# Patient Record
Sex: MALE | Race: WHITE | NOT HISPANIC OR LATINO | Employment: STUDENT | URBAN - METROPOLITAN AREA
[De-identification: names, ages, dates, MRNs, and addresses within clinical notes are randomized per-mention and may not be internally consistent; named-entity substitution may affect disease eponyms.]

---

## 2017-09-30 ENCOUNTER — HOSPITAL ENCOUNTER (INPATIENT)
Facility: HOSPITAL | Age: 22
LOS: 6 days | Discharge: HOME | DRG: 340 | End: 2017-10-06
Attending: EMERGENCY MEDICINE | Admitting: SURGERY
Payer: COMMERCIAL

## 2017-09-30 DIAGNOSIS — K35.32 PERFORATED APPENDICITIS: ICD-10-CM

## 2017-09-30 DIAGNOSIS — R00.0 TACHYCARDIA WITH HEART RATE 100-120 BEATS PER MINUTE: ICD-10-CM

## 2017-09-30 LAB
ALBUMIN SERPL BCP-MCNC: 4 G/DL
ALP SERPL-CCNC: 59 U/L
ALT SERPL W/O P-5'-P-CCNC: 16 U/L
ANION GAP SERPL CALC-SCNC: 13 MMOL/L
AST SERPL-CCNC: 16 U/L
BACTERIA #/AREA URNS AUTO: NORMAL /HPF
BASOPHILS # BLD AUTO: 0.01 K/UL
BASOPHILS NFR BLD: 0.1 %
BILIRUB SERPL-MCNC: 1.6 MG/DL
BILIRUB UR QL STRIP: NEGATIVE
BUN SERPL-MCNC: 16 MG/DL
CALCIUM SERPL-MCNC: 10.3 MG/DL
CHLORIDE SERPL-SCNC: 99 MMOL/L
CLARITY UR REFRACT.AUTO: CLEAR
CO2 SERPL-SCNC: 22 MMOL/L
COLOR UR AUTO: YELLOW
CREAT SERPL-MCNC: 1.2 MG/DL
DIFFERENTIAL METHOD: ABNORMAL
EOSINOPHIL # BLD AUTO: 0 K/UL
EOSINOPHIL NFR BLD: 0 %
ERYTHROCYTE [DISTWIDTH] IN BLOOD BY AUTOMATED COUNT: 13.4 %
EST. GFR  (AFRICAN AMERICAN): >60 ML/MIN/1.73 M^2
EST. GFR  (NON AFRICAN AMERICAN): >60 ML/MIN/1.73 M^2
GLUCOSE SERPL-MCNC: 132 MG/DL
GLUCOSE UR QL STRIP: NEGATIVE
HCT VFR BLD AUTO: 43.7 %
HGB BLD-MCNC: 15.5 G/DL
HGB UR QL STRIP: ABNORMAL
HYALINE CASTS UR QL AUTO: 0 /LPF
KETONES UR QL STRIP: NEGATIVE
LACTATE SERPL-SCNC: 1.9 MMOL/L
LEUKOCYTE ESTERASE UR QL STRIP: NEGATIVE
LIPASE SERPL-CCNC: 4 U/L
LYMPHOCYTES # BLD AUTO: 0.5 K/UL
LYMPHOCYTES NFR BLD: 3.7 %
MCH RBC QN AUTO: 29.3 PG
MCHC RBC AUTO-ENTMCNC: 35.5 G/DL
MCV RBC AUTO: 83 FL
MICROSCOPIC COMMENT: NORMAL
MONOCYTES # BLD AUTO: 0.5 K/UL
MONOCYTES NFR BLD: 3.6 %
NEUTROPHILS # BLD AUTO: 13.5 K/UL
NEUTROPHILS NFR BLD: 92.3 %
NITRITE UR QL STRIP: NEGATIVE
PH UR STRIP: 6 [PH] (ref 5–8)
PLATELET # BLD AUTO: 188 K/UL
PMV BLD AUTO: 10.3 FL
POCT GLUCOSE: 125 MG/DL (ref 70–110)
POTASSIUM SERPL-SCNC: 3.8 MMOL/L
PROT SERPL-MCNC: 8.4 G/DL
PROT UR QL STRIP: ABNORMAL
RBC # BLD AUTO: 5.29 M/UL
RBC #/AREA URNS AUTO: 4 /HPF (ref 0–4)
SODIUM SERPL-SCNC: 134 MMOL/L
SP GR UR STRIP: >=1.03 (ref 1–1.03)
URN SPEC COLLECT METH UR: ABNORMAL
UROBILINOGEN UR STRIP-ACNC: NEGATIVE EU/DL
WBC # BLD AUTO: 14.65 K/UL
WBC #/AREA URNS AUTO: 1 /HPF (ref 0–5)

## 2017-09-30 PROCEDURE — 11000001 HC ACUTE MED/SURG PRIVATE ROOM

## 2017-09-30 PROCEDURE — 63600175 PHARM REV CODE 636 W HCPCS: Performed by: EMERGENCY MEDICINE

## 2017-09-30 PROCEDURE — 96375 TX/PRO/DX INJ NEW DRUG ADDON: CPT

## 2017-09-30 PROCEDURE — 96365 THER/PROPH/DIAG IV INF INIT: CPT

## 2017-09-30 PROCEDURE — 96361 HYDRATE IV INFUSION ADD-ON: CPT

## 2017-09-30 PROCEDURE — S0030 INJECTION, METRONIDAZOLE: HCPCS | Performed by: EMERGENCY MEDICINE

## 2017-09-30 PROCEDURE — 96367 TX/PROPH/DG ADDL SEQ IV INF: CPT

## 2017-09-30 PROCEDURE — 25500020 PHARM REV CODE 255: Performed by: EMERGENCY MEDICINE

## 2017-09-30 PROCEDURE — 94761 N-INVAS EAR/PLS OXIMETRY MLT: CPT

## 2017-09-30 PROCEDURE — 96366 THER/PROPH/DIAG IV INF ADDON: CPT

## 2017-09-30 PROCEDURE — 82962 GLUCOSE BLOOD TEST: CPT

## 2017-09-30 PROCEDURE — 80053 COMPREHEN METABOLIC PANEL: CPT

## 2017-09-30 PROCEDURE — 99285 EMERGENCY DEPT VISIT HI MDM: CPT | Mod: 25

## 2017-09-30 PROCEDURE — 93005 ELECTROCARDIOGRAM TRACING: CPT

## 2017-09-30 PROCEDURE — 83690 ASSAY OF LIPASE: CPT

## 2017-09-30 PROCEDURE — 83605 ASSAY OF LACTIC ACID: CPT

## 2017-09-30 PROCEDURE — 85025 COMPLETE CBC W/AUTO DIFF WBC: CPT

## 2017-09-30 PROCEDURE — 99285 EMERGENCY DEPT VISIT HI MDM: CPT | Mod: ,,, | Performed by: EMERGENCY MEDICINE

## 2017-09-30 PROCEDURE — 81001 URINALYSIS AUTO W/SCOPE: CPT

## 2017-09-30 PROCEDURE — 63600175 PHARM REV CODE 636 W HCPCS: Performed by: STUDENT IN AN ORGANIZED HEALTH CARE EDUCATION/TRAINING PROGRAM

## 2017-09-30 PROCEDURE — 25000003 PHARM REV CODE 250: Performed by: STUDENT IN AN ORGANIZED HEALTH CARE EDUCATION/TRAINING PROGRAM

## 2017-09-30 PROCEDURE — 93010 ELECTROCARDIOGRAM REPORT: CPT | Mod: ,,, | Performed by: INTERNAL MEDICINE

## 2017-09-30 PROCEDURE — 25000003 PHARM REV CODE 250: Performed by: SURGERY

## 2017-09-30 PROCEDURE — 25000003 PHARM REV CODE 250: Performed by: EMERGENCY MEDICINE

## 2017-09-30 PROCEDURE — 96376 TX/PRO/DX INJ SAME DRUG ADON: CPT

## 2017-09-30 PROCEDURE — 87040 BLOOD CULTURE FOR BACTERIA: CPT

## 2017-09-30 RX ORDER — DIPHENHYDRAMINE HYDROCHLORIDE 50 MG/ML
12.5 INJECTION INTRAMUSCULAR; INTRAVENOUS EVERY 6 HOURS PRN
Status: DISCONTINUED | OUTPATIENT
Start: 2017-09-30 | End: 2017-10-06

## 2017-09-30 RX ORDER — HYDROMORPHONE HYDROCHLORIDE 1 MG/ML
0.5 INJECTION, SOLUTION INTRAMUSCULAR; INTRAVENOUS; SUBCUTANEOUS
Status: COMPLETED | OUTPATIENT
Start: 2017-09-30 | End: 2017-09-30

## 2017-09-30 RX ORDER — HYDROMORPHONE HYDROCHLORIDE 1 MG/ML
0.5 INJECTION, SOLUTION INTRAMUSCULAR; INTRAVENOUS; SUBCUTANEOUS EVERY 4 HOURS PRN
Status: DISCONTINUED | OUTPATIENT
Start: 2017-09-30 | End: 2017-10-03

## 2017-09-30 RX ORDER — ZOLPIDEM TARTRATE 5 MG/1
5 TABLET ORAL NIGHTLY PRN
Status: DISCONTINUED | OUTPATIENT
Start: 2017-09-30 | End: 2017-10-06

## 2017-09-30 RX ORDER — KETOROLAC TROMETHAMINE 15 MG/ML
15 INJECTION, SOLUTION INTRAMUSCULAR; INTRAVENOUS ONCE
Status: DISCONTINUED | OUTPATIENT
Start: 2017-09-30 | End: 2017-10-03

## 2017-09-30 RX ORDER — FAMOTIDINE 10 MG/ML
20 INJECTION INTRAVENOUS 2 TIMES DAILY
Status: DISCONTINUED | OUTPATIENT
Start: 2017-09-30 | End: 2017-10-06 | Stop reason: HOSPADM

## 2017-09-30 RX ORDER — ACETAMINOPHEN 325 MG/1
650 TABLET ORAL ONCE
Status: COMPLETED | OUTPATIENT
Start: 2017-09-30 | End: 2017-09-30

## 2017-09-30 RX ORDER — METRONIDAZOLE 500 MG/100ML
500 INJECTION, SOLUTION INTRAVENOUS
Status: DISCONTINUED | OUTPATIENT
Start: 2017-09-30 | End: 2017-10-05

## 2017-09-30 RX ORDER — IPRATROPIUM BROMIDE AND ALBUTEROL SULFATE 2.5; .5 MG/3ML; MG/3ML
3 SOLUTION RESPIRATORY (INHALATION) EVERY 6 HOURS PRN
Status: DISCONTINUED | OUTPATIENT
Start: 2017-09-30 | End: 2017-10-06 | Stop reason: HOSPADM

## 2017-09-30 RX ORDER — OXYCODONE AND ACETAMINOPHEN 10; 325 MG/1; MG/1
1 TABLET ORAL EVERY 4 HOURS PRN
Status: DISCONTINUED | OUTPATIENT
Start: 2017-09-30 | End: 2017-10-06

## 2017-09-30 RX ORDER — ENOXAPARIN SODIUM 100 MG/ML
40 INJECTION SUBCUTANEOUS
Status: DISCONTINUED | OUTPATIENT
Start: 2017-10-01 | End: 2017-10-06

## 2017-09-30 RX ORDER — ONDANSETRON 2 MG/ML
4 INJECTION INTRAMUSCULAR; INTRAVENOUS
Status: COMPLETED | OUTPATIENT
Start: 2017-09-30 | End: 2017-09-30

## 2017-09-30 RX ORDER — CIPROFLOXACIN 2 MG/ML
400 INJECTION, SOLUTION INTRAVENOUS
Status: COMPLETED | OUTPATIENT
Start: 2017-09-30 | End: 2017-09-30

## 2017-09-30 RX ORDER — OXYCODONE AND ACETAMINOPHEN 5; 325 MG/1; MG/1
1 TABLET ORAL EVERY 4 HOURS PRN
Status: DISCONTINUED | OUTPATIENT
Start: 2017-09-30 | End: 2017-10-06

## 2017-09-30 RX ORDER — SODIUM CHLORIDE, SODIUM LACTATE, POTASSIUM CHLORIDE, CALCIUM CHLORIDE 600; 310; 30; 20 MG/100ML; MG/100ML; MG/100ML; MG/100ML
INJECTION, SOLUTION INTRAVENOUS CONTINUOUS
Status: DISCONTINUED | OUTPATIENT
Start: 2017-09-30 | End: 2017-10-05

## 2017-09-30 RX ORDER — METRONIDAZOLE 500 MG/100ML
500 INJECTION, SOLUTION INTRAVENOUS
Status: COMPLETED | OUTPATIENT
Start: 2017-09-30 | End: 2017-09-30

## 2017-09-30 RX ORDER — ACETAMINOPHEN 325 MG/1
650 TABLET ORAL EVERY 4 HOURS PRN
Status: DISCONTINUED | OUTPATIENT
Start: 2017-09-30 | End: 2017-10-06 | Stop reason: HOSPADM

## 2017-09-30 RX ORDER — ONDANSETRON 2 MG/ML
8 INJECTION INTRAMUSCULAR; INTRAVENOUS EVERY 8 HOURS PRN
Status: DISCONTINUED | OUTPATIENT
Start: 2017-09-30 | End: 2017-10-06 | Stop reason: HOSPADM

## 2017-09-30 RX ORDER — CIPROFLOXACIN 2 MG/ML
400 INJECTION, SOLUTION INTRAVENOUS
Status: DISCONTINUED | OUTPATIENT
Start: 2017-09-30 | End: 2017-10-05

## 2017-09-30 RX ADMIN — HYDROMORPHONE HYDROCHLORIDE 0.5 MG: 1 INJECTION, SOLUTION INTRAMUSCULAR; INTRAVENOUS; SUBCUTANEOUS at 04:09

## 2017-09-30 RX ADMIN — METRONIDAZOLE 500 MG: 500 INJECTION, SOLUTION INTRAVENOUS at 07:09

## 2017-09-30 RX ADMIN — ACETAMINOPHEN 650 MG: 325 TABLET ORAL at 04:09

## 2017-09-30 RX ADMIN — ONDANSETRON 4 MG: 2 INJECTION INTRAMUSCULAR; INTRAVENOUS at 04:09

## 2017-09-30 RX ADMIN — HYDROMORPHONE HYDROCHLORIDE 0.5 MG: 1 INJECTION, SOLUTION INTRAMUSCULAR; INTRAVENOUS; SUBCUTANEOUS at 08:09

## 2017-09-30 RX ADMIN — CIPROFLOXACIN 400 MG: 2 INJECTION, SOLUTION INTRAVENOUS at 07:09

## 2017-09-30 RX ADMIN — SODIUM CHLORIDE 1000 ML: 0.9 INJECTION, SOLUTION INTRAVENOUS at 03:09

## 2017-09-30 RX ADMIN — IOHEXOL 75 ML: 350 INJECTION, SOLUTION INTRAVENOUS at 05:09

## 2017-09-30 RX ADMIN — SODIUM CHLORIDE 1000 ML: 0.9 INJECTION, SOLUTION INTRAVENOUS at 06:09

## 2017-09-30 RX ADMIN — SODIUM CHLORIDE, SODIUM LACTATE, POTASSIUM CHLORIDE, AND CALCIUM CHLORIDE: 600; 310; 30; 20 INJECTION, SOLUTION INTRAVENOUS at 08:09

## 2017-09-30 NOTE — ED NOTES
LOC: The patient is awake, alert, and oriented to place, time, situation. Affect is appropriate.  Speech is appropriate and clear.     APPEARANCE: Patient resting on stretcher; appears uncomfortable but in no acute distress.  Patient is clean and well groomed.    SKIN: The skin is warm and dry; color consistent with ethnicity.  Patient has normal skin turgor and moist mucus membranes.  Skin intact; no breakdown or bruising noted.     MUSCULOSKELETAL: Patient moving upper and lower extremities without difficulty.  Complains of generalized weakness.     RESPIRATORY: Airway is open and patent. Respirations spontaneous, even, easy, and non-labored.  Patient has a normal effort and rate.  No accessory muscle use noted. Denies cough.     CARDIAC:  Normal rhythm and rate noted.  No peripheral edema noted. No complaints of chest pain.      ABDOMEN: Soft but tender to palpation to the lower abdomen.  No distention noted.     NEUROLOGIC: Eyes open spontaneously.  Behavior appropriate to situation.  Follows commands; facial expression symmetrical.  Purposeful motor response noted; normal sensation in all extremities.  Pt complains of dizziness and lightheadedness.

## 2017-09-30 NOTE — ED TRIAGE NOTES
Pt states he started to experience constipation and gas pains on Thursday morning, took laxatives and started to experience severe abdominal pain with multiple episodes of diarrhea, nausea and vomiting on Friday.  Pt also complains of feeling dizzy and lightheaded.  Pt states symptoms are worse with eating.

## 2017-09-30 NOTE — ED PROVIDER NOTES
Encounter Date: 9/30/2017       History     Chief Complaint   Patient presents with    Abdominal Pain     and diarrhea X 3 days; fevers off and on     20 yo man with no significant PMHx who presents to the ED for worsening abdominal pain.  Patient reports having constipation and gas pains that started on Thursday for which he took laxatives.  Prior to Thursday, his last BM was Wednesday.  On Friday, he then developed profuse diarrhea (watery and green) on Friday.  He reports having ~10 episodes of diarrhea associated with nausea and vomiting 4x.  Patient developed severe intermittent, throbbing lower abdominal pain yesterday that has progressively worsened and seems to hurt worse with any movement or eating.  Also has associated lightheadedness and sweats. Denies any urinary complaints, shoulder pain, back pain, SOB, CP.           Review of patient's allergies indicates:  No Known Allergies  History reviewed. No pertinent past medical history.  Past Surgical History:   Procedure Laterality Date    MOUTH SURGERY       History reviewed. No pertinent family history.  Social History   Substance Use Topics    Smoking status: Never Smoker    Smokeless tobacco: Never Used    Alcohol use 6.0 oz/week     10 Cans of beer per week     Review of Systems   Constitutional: Positive for appetite change, diaphoresis and fever.   HENT: Negative for sore throat and trouble swallowing.    Eyes: Negative for visual disturbance.   Respiratory: Negative for chest tightness and shortness of breath.    Cardiovascular: Negative for chest pain and leg swelling.   Gastrointestinal: Positive for abdominal pain (lower), diarrhea, nausea and vomiting. Negative for abdominal distention, blood in stool and constipation.   Genitourinary: Negative for difficulty urinating and dysuria.   Musculoskeletal: Negative for back pain.        No shoulder pain   Skin: Negative for color change.   Neurological: Positive for dizziness and light-headedness.        Physical Exam     Initial Vitals [09/30/17 1512]   BP Pulse Resp Temp SpO2   (!) 105/58 (!) 130 18 98.7 °F (37.1 °C) 97 %      MAP       73.67         Physical Exam    Constitutional: He appears well-developed and well-nourished.   Ill appearing. Fever of 101     HENT:   Head: Normocephalic and atraumatic.   Dry mucus membranes   Eyes: Conjunctivae and EOM are normal. Pupils are equal, round, and reactive to light.   Cardiovascular: Regular rhythm, normal heart sounds and intact distal pulses.   tachycardic   Pulmonary/Chest: Breath sounds normal. No respiratory distress.   Abdominal: Soft. Bowel sounds are normal. He exhibits no distension. There is tenderness (lower abdomen L>R). There is guarding. There is no rebound.   Worsening abdominal pain with rocking hips.     Musculoskeletal: Normal range of motion. He exhibits no edema.   Neurological: He is alert and oriented to person, place, and time.   Skin: Skin is warm and dry.   Psychiatric: He has a normal mood and affect.         ED Course   Procedures  Labs Reviewed   CBC W/ AUTO DIFFERENTIAL - Abnormal; Notable for the following:        Result Value    WBC 14.65 (*)     Gran # 13.5 (*)     Lymph # 0.5 (*)     Gran% 92.3 (*)     Lymph% 3.7 (*)     Mono% 3.6 (*)     All other components within normal limits   COMPREHENSIVE METABOLIC PANEL - Abnormal; Notable for the following:     Sodium 134 (*)     CO2 22 (*)     Glucose 132 (*)     Total Bilirubin 1.6 (*)     All other components within normal limits   URINALYSIS, REFLEX TO URINE CULTURE - Abnormal; Notable for the following:     Specific Gravity, UA >=1.030 (*)     Protein, UA 1+ (*)     Occult Blood UA 1+ (*)     All other components within normal limits    Narrative:     Preferred Collection Type->Urine, Clean Catch   POCT GLUCOSE - Abnormal; Notable for the following:     POCT Glucose 125 (*)     All other components within normal limits   LIPASE   LACTIC ACID, PLASMA   URINALYSIS MICROSCOPIC     Narrative:     Preferred Collection Type->Urine, Clean Catch     EKG Readings: (Independently Interpreted)   Rhythm: Sinus Tachycardia.       X-Rays:   Independently Interpreted Readings:   Abdomen: CAT scan of abdomen and pelvis with IV contrast showed acute appendicitis also with an apparent contained perforation     Medical Decision Making:   Initial Assessment:   Patient evaluated because of abdominal discomfort this been going on for several days which has become acute this is associated with peritoneal signs  Differential Diagnosis:   Appendicitis colitis  Clinical Tests:   Lab Tests: Ordered and Reviewed  Radiological Study: Ordered and Reviewed  Medical Tests: Ordered and Reviewed  ED Management:  Will do labs hydrate the patient control the pain control the nausea do abdomen and pelvis CT with IV contrast       APC / Resident Notes:   21M with no PMHx is here for 2 days of diarrhea and severe lower abdominal pain associated with nausea, vomiting, and fever.     Ddx includes but not limited to appendicitis, gastroenteritis, peritonitis, colitis, cholecystitis, hernia, diverticulitis, cystitis.  Patient is hypotensive, tachycardic, and febrile at 101.1 with peritoneal signs on exam.  Will order basic labs, UA, lactate, blood cultures, and obtain CT abdomen.  Will give 2L bolus, tylenol, zofran, and dilaudid. Vital signs have improved after fluids and pain is better controlled. CBC shows leukocytosis with WBC 14.7. CT abdomen shows acute appendicitis with contained perforation. Patient given one dose of Cipro and Flagyl in the ED. General surgery consulted and will admit patient for management of acute appendicitis.          Attending Attestation:             Attending ED Notes:   Patient presenting with abdominal discomfort for several days which has continued to worsen now presenting with peritoneal signs and very concern for acute abdomen patient was evaluated with blood work blood cultures patient was  hydrated patient was given pain medication and medication for nausea patient had a CT of the abdomen and pelvis with IV contrast which showed an acute appendicitis with a contained perforation patient was evaluated by general surgery and the patient was admitted for surgery to the general surgery service patient was in serious condition          ED Course      Clinical Impression:   Diagnoses of Tachycardia with heart rate 100-120 beats per minute and Perforated appendicitis were pertinent to this visit.    Disposition:   Disposition: Admitted  Condition: Serious                        Nick Mtz MD  10/01/17 0722

## 2017-10-01 LAB
ALBUMIN SERPL BCP-MCNC: 3 G/DL
ALP SERPL-CCNC: 55 U/L
ALT SERPL W/O P-5'-P-CCNC: 11 U/L
ANION GAP SERPL CALC-SCNC: 10 MMOL/L
AST SERPL-CCNC: 11 U/L
BASOPHILS # BLD AUTO: 0 K/UL
BASOPHILS NFR BLD: 0 %
BILIRUB SERPL-MCNC: 0.9 MG/DL
BUN SERPL-MCNC: 14 MG/DL
CALCIUM SERPL-MCNC: 9.5 MG/DL
CHLORIDE SERPL-SCNC: 102 MMOL/L
CO2 SERPL-SCNC: 22 MMOL/L
CREAT SERPL-MCNC: 0.9 MG/DL
DIFFERENTIAL METHOD: ABNORMAL
EOSINOPHIL # BLD AUTO: 0 K/UL
EOSINOPHIL NFR BLD: 0 %
ERYTHROCYTE [DISTWIDTH] IN BLOOD BY AUTOMATED COUNT: 13.9 %
EST. GFR  (AFRICAN AMERICAN): >60 ML/MIN/1.73 M^2
EST. GFR  (NON AFRICAN AMERICAN): >60 ML/MIN/1.73 M^2
GLUCOSE SERPL-MCNC: 117 MG/DL
HCT VFR BLD AUTO: 36.6 %
HGB BLD-MCNC: 12.4 G/DL
LYMPHOCYTES # BLD AUTO: 0.4 K/UL
LYMPHOCYTES NFR BLD: 3.1 %
MCH RBC QN AUTO: 28.4 PG
MCHC RBC AUTO-ENTMCNC: 33.9 G/DL
MCV RBC AUTO: 84 FL
MONOCYTES # BLD AUTO: 0.8 K/UL
MONOCYTES NFR BLD: 6.4 %
NEUTROPHILS # BLD AUTO: 11.4 K/UL
NEUTROPHILS NFR BLD: 90.3 %
PLATELET # BLD AUTO: 154 K/UL
PMV BLD AUTO: 10.6 FL
POTASSIUM SERPL-SCNC: 3.5 MMOL/L
PROT SERPL-MCNC: 6.5 G/DL
RBC # BLD AUTO: 4.36 M/UL
SODIUM SERPL-SCNC: 134 MMOL/L
WBC # BLD AUTO: 12.57 K/UL

## 2017-10-01 PROCEDURE — 11000001 HC ACUTE MED/SURG PRIVATE ROOM

## 2017-10-01 PROCEDURE — 63600175 PHARM REV CODE 636 W HCPCS: Performed by: STUDENT IN AN ORGANIZED HEALTH CARE EDUCATION/TRAINING PROGRAM

## 2017-10-01 PROCEDURE — 63600175 PHARM REV CODE 636 W HCPCS: Performed by: SURGERY

## 2017-10-01 PROCEDURE — 80053 COMPREHEN METABOLIC PANEL: CPT

## 2017-10-01 PROCEDURE — S0030 INJECTION, METRONIDAZOLE: HCPCS | Performed by: SURGERY

## 2017-10-01 PROCEDURE — 99223 1ST HOSP IP/OBS HIGH 75: CPT | Mod: ,,, | Performed by: SURGERY

## 2017-10-01 PROCEDURE — 25000003 PHARM REV CODE 250: Performed by: SURGERY

## 2017-10-01 PROCEDURE — S0028 INJECTION, FAMOTIDINE, 20 MG: HCPCS | Performed by: SURGERY

## 2017-10-01 PROCEDURE — 36415 COLL VENOUS BLD VENIPUNCTURE: CPT

## 2017-10-01 PROCEDURE — 85025 COMPLETE CBC W/AUTO DIFF WBC: CPT

## 2017-10-01 RX ADMIN — CIPROFLOXACIN 400 MG: 2 INJECTION, SOLUTION INTRAVENOUS at 08:10

## 2017-10-01 RX ADMIN — OXYCODONE HYDROCHLORIDE AND ACETAMINOPHEN 1 TABLET: 10; 325 TABLET ORAL at 12:10

## 2017-10-01 RX ADMIN — METRONIDAZOLE 500 MG: 500 INJECTION, SOLUTION INTRAVENOUS at 10:10

## 2017-10-01 RX ADMIN — ONDANSETRON 8 MG: 2 INJECTION INTRAMUSCULAR; INTRAVENOUS at 11:10

## 2017-10-01 RX ADMIN — FAMOTIDINE 20 MG: 10 INJECTION, SOLUTION INTRAVENOUS at 08:10

## 2017-10-01 RX ADMIN — VANCOMYCIN HYDROCHLORIDE 1000 MG: 1 INJECTION, POWDER, LYOPHILIZED, FOR SOLUTION INTRAVENOUS at 09:10

## 2017-10-01 RX ADMIN — ZOLPIDEM TARTRATE 5 MG: 5 TABLET, FILM COATED ORAL at 09:10

## 2017-10-01 RX ADMIN — OXYCODONE HYDROCHLORIDE AND ACETAMINOPHEN 1 TABLET: 10; 325 TABLET ORAL at 03:10

## 2017-10-01 RX ADMIN — METRONIDAZOLE 500 MG: 500 INJECTION, SOLUTION INTRAVENOUS at 05:10

## 2017-10-01 RX ADMIN — ZOLPIDEM TARTRATE 5 MG: 5 TABLET, FILM COATED ORAL at 03:10

## 2017-10-01 RX ADMIN — OXYCODONE HYDROCHLORIDE AND ACETAMINOPHEN 1 TABLET: 10; 325 TABLET ORAL at 04:10

## 2017-10-01 RX ADMIN — HYDROMORPHONE HYDROCHLORIDE 0.5 MG: 1 INJECTION, SOLUTION INTRAMUSCULAR; INTRAVENOUS; SUBCUTANEOUS at 08:10

## 2017-10-01 RX ADMIN — ENOXAPARIN SODIUM 40 MG: 100 INJECTION SUBCUTANEOUS at 04:10

## 2017-10-01 RX ADMIN — METRONIDAZOLE 500 MG: 500 INJECTION, SOLUTION INTRAVENOUS at 12:10

## 2017-10-01 RX ADMIN — CIPROFLOXACIN 400 MG: 2 INJECTION, SOLUTION INTRAVENOUS at 11:10

## 2017-10-01 NOTE — SUBJECTIVE & OBJECTIVE
Interval History: No acute events overnight. Pt seen and examined at the bedside. Vital signs stable. Feeling better this morning. No nausea or vomiting. Pain improved. Passing gas with some watery bowel movements as well.     Medications:  Continuous Infusions:   lactated ringers 125 mL/hr at 09/30/17 2037     Scheduled Meds:   ciprofloxacin  400 mg Intravenous Q12H    enoxaparin  40 mg Subcutaneous Q24H    famotidine (PF)  20 mg Intravenous BID    ketorolac  15 mg Intravenous Once    metronidazole  500 mg Intravenous Q8H    sodium chloride 0.9%  1,000 mL Intravenous Once     PRN Meds:acetaminophen, albuterol-ipratropium 2.5mg-0.5mg/3mL, diphenhydrAMINE, HYDROmorphone, ondansetron, oxycodone-acetaminophen, oxycodone-acetaminophen, promethazine (PHENERGAN) IVPB, zolpidem     Review of patient's allergies indicates:  No Known Allergies  Objective:     Vital Signs (Most Recent):  Temp: 99.1 °F (37.3 °C) (10/01/17 0352)  Pulse: 99 (10/01/17 0352)  Resp: 14 (10/01/17 0352)  BP: 120/62 (10/01/17 0352)  SpO2: 98 % (10/01/17 0352) Vital Signs (24h Range):  Temp:  [98.7 °F (37.1 °C)-101.2 °F (38.4 °C)] 99.1 °F (37.3 °C)  Pulse:  [] 99  Resp:  [14-20] 14  SpO2:  [95 %-99 %] 98 %  BP: (105-129)/(58-66) 120/62     Weight: 77.1 kg (170 lb)  Body mass index is 19.65 kg/m².    Intake/Output - Last 3 Shifts       09/29 0700 - 09/30 0659 09/30 0700 - 10/01 0659 10/01 0700 - 10/02 0659    IV Piggyback  1100     Total Intake(mL/kg)  1100 (14.3)     Net   +1100                   Physical Exam   Constitutional: He is oriented to person, place, and time. He appears well-developed and well-nourished. No distress.   HENT:   Head: Normocephalic and atraumatic.   Eyes: No scleral icterus.   Neck: No tracheal deviation present.   Cardiovascular: Normal rate.    Pulmonary/Chest: Effort normal. No respiratory distress.   Abdominal: Soft. He exhibits no distension. There is no tenderness. There is no guarding.   Neurological: He is  alert and oriented to person, place, and time. No cranial nerve deficit.   Skin: Skin is warm. No erythema.   Psychiatric: He has a normal mood and affect. His behavior is normal.   Nursing note and vitals reviewed.      Significant Labs:  CBC:   Recent Labs  Lab 10/01/17  0442   WBC 12.57   RBC 4.36*   HGB 12.4*   HCT 36.6*      MCV 84   MCH 28.4   MCHC 33.9     CMP:   Recent Labs  Lab 10/01/17  0442   *   CALCIUM 9.5   ALBUMIN 3.0*   PROT 6.5   *   K 3.5   CO2 22*      BUN 14   CREATININE 0.9   ALKPHOS 55   ALT 11   AST 11   BILITOT 0.9       Significant Diagnostics:  CT: I have reviewed all pertinent results/findings within the past 24 hours and my personal findings are:  perforated appendicitis

## 2017-10-01 NOTE — NURSING
Micro called to notify nurse that blood culture drawn on 9/30 from anaerobic bottle is positive for cocci in chains resembling strep. Notified Dr. Lui.

## 2017-10-01 NOTE — PLAN OF CARE
Problem: Patient Care Overview  Goal: Plan of Care Review  Outcome: Ongoing (interventions implemented as appropriate)  Pt AA0x3 and VSS. Family at bedside. Two side rails up, bed locked, call light within reach. No falls noted as these precautions remain. Pt free of skin breakdown as the pt moves well independently. IVF infusing to PIV. Diet advanced to CLD, pt tolerating. SCDs on and functioning. Pain controlled well with PRN meds. Hourly rounds made and no complaints at this time noted. Will resume with plan of care.

## 2017-10-01 NOTE — PROGRESS NOTES
[facility]  General Surgery  Progress Note    Subjective:     History of Present Illness:  No notes on file    Post-Op Info:  * No surgery found *         Interval History: No acute events overnight. Pt seen and examined at the bedside. Vital signs stable. Feeling better this morning. No nausea or vomiting. Pain improved. Passing gas with some watery bowel movements as well.     Medications:  Continuous Infusions:   lactated ringers 125 mL/hr at 09/30/17 2037     Scheduled Meds:   ciprofloxacin  400 mg Intravenous Q12H    enoxaparin  40 mg Subcutaneous Q24H    famotidine (PF)  20 mg Intravenous BID    ketorolac  15 mg Intravenous Once    metronidazole  500 mg Intravenous Q8H    sodium chloride 0.9%  1,000 mL Intravenous Once     PRN Meds:acetaminophen, albuterol-ipratropium 2.5mg-0.5mg/3mL, diphenhydrAMINE, HYDROmorphone, ondansetron, oxycodone-acetaminophen, oxycodone-acetaminophen, promethazine (PHENERGAN) IVPB, zolpidem     Review of patient's allergies indicates:  No Known Allergies  Objective:     Vital Signs (Most Recent):  Temp: 99.1 °F (37.3 °C) (10/01/17 0352)  Pulse: 99 (10/01/17 0352)  Resp: 14 (10/01/17 0352)  BP: 120/62 (10/01/17 0352)  SpO2: 98 % (10/01/17 0352) Vital Signs (24h Range):  Temp:  [98.7 °F (37.1 °C)-101.2 °F (38.4 °C)] 99.1 °F (37.3 °C)  Pulse:  [] 99  Resp:  [14-20] 14  SpO2:  [95 %-99 %] 98 %  BP: (105-129)/(58-66) 120/62     Weight: 77.1 kg (170 lb)  Body mass index is 19.65 kg/m².    Intake/Output - Last 3 Shifts       09/29 0700 - 09/30 0659 09/30 0700 - 10/01 0659 10/01 0700 - 10/02 0659    IV Piggyback  1100     Total Intake(mL/kg)  1100 (14.3)     Net   +1100                   Physical Exam   Constitutional: He is oriented to person, place, and time. He appears well-developed and well-nourished. No distress.   HENT:   Head: Normocephalic and atraumatic.   Eyes: No scleral icterus.   Neck: No tracheal deviation present.   Cardiovascular: Normal  rate.    Pulmonary/Chest: Effort normal. No respiratory distress.   Abdominal: Soft. He exhibits no distension. There is no tenderness. There is no guarding.   Neurological: He is alert and oriented to person, place, and time. No cranial nerve deficit.   Skin: Skin is warm. No erythema.   Psychiatric: He has a normal mood and affect. His behavior is normal.   Nursing note and vitals reviewed.      Significant Labs:  CBC:   Recent Labs  Lab 10/01/17  0442   WBC 12.57   RBC 4.36*   HGB 12.4*   HCT 36.6*      MCV 84   MCH 28.4   MCHC 33.9     CMP:   Recent Labs  Lab 10/01/17  0442   *   CALCIUM 9.5   ALBUMIN 3.0*   PROT 6.5   *   K 3.5   CO2 22*      BUN 14   CREATININE 0.9   ALKPHOS 55   ALT 11   AST 11   BILITOT 0.9       Significant Diagnostics:  CT: I have reviewed all pertinent results/findings within the past 24 hours and my personal findings are:  perforated appendicitis    Assessment/Plan:     * Perforated appendicitis    Reza Meyer is a 21 y.o. male with perforated appendicitis    Regular diet  IVF  DVT and GI ppx  Prn pain meds  Ambulate  IV abx  Once clinical symptoms resolve will DC home with abx with plans for interval appendectomy            Mitesh Lang MD  General Surgery  [facility]

## 2017-10-01 NOTE — H&P
GENERAL SURGERY  H&P      REASON FOR ADMISSION:  Perforated appendicitis [K35.2]    SUBJECTIVE:     HISTORY OF PRESENT ILLNESS:  Reza Meyer is a 21 y.o. male who presents to [facility] on 09/30/2017 with perforated appendicitis.    The patient reports lower abdominal pain since he woke up on Thursday morning.  He also describes what he thought was constipation from Wednesday until Friday then has been having diarrhea since then.  He has had nausea and vomiting and last vomited yesterday evening.  No prior episodes of similar symptoms.  Initially sought help at [facility] or something similar and was told he likely had constipation and gas.      Initially temperature of 101.1 in ED and tachycardic to 130, which has improved now in 90s after IV fluids.  WBC 14.  CT consistent with perforated appendicitis.    The patient's past surgical history is pertinent for no prior abdominal surgeries.    He is otherwise healthy with no medical problems.  He is from [place] and goes to college at [employer].       MEDICATIONS:  Home Medications:  No current facility-administered medications on file prior to encounter.      No current outpatient prescriptions on file prior to encounter.     Inpatient Medications:   ciprofloxacin (CIPRO)400mg/200ml D5W IVPB  400 mg Intravenous ED 1 Time    ciprofloxacin  400 mg Intravenous Q12H    [START ON 10/1/2017] enoxaparin  40 mg Subcutaneous Q24H    HYDROmorphone  0.5 mg Intravenous ED 1 Time    ketorolac  15 mg Intravenous Once    metronidazole  500 mg Intravenous ED 1 Time    metronidazole  500 mg Intravenous Q8H    sodium chloride 0.9%  1,000 mL Intravenous Once     Infusions:   lactated ringers       PRN Medications:acetaminophen, albuterol-ipratropium 2.5mg-0.5mg/3mL, diphenhydrAMINE, HYDROmorphone, ondansetron, oxycodone-acetaminophen, oxycodone-acetaminophen, promethazine (PHENERGAN) IVPB, zolpidem    ALLERGIES:    Review of patient's allergies indicates:  No Known  Allergies    PAST MEDICAL HISTORY:    History reviewed. No pertinent past medical history.    SURGICAL HISTORY:  Past Surgical History:   Procedure Laterality Date    MOUTH SURGERY         FAMILY HISTORY:  History reviewed. No pertinent family history.    SOCIAL HISTORY:  Social History   Substance Use Topics    Smoking status: Never Smoker    Smokeless tobacco: Never Used    Alcohol use 6.0 oz/week     10 Cans of beer per week        REVIEW OF SYSTEMS:  A 10-point review of systems is negative except for the above mentioned in the HPI.    OBJECTIVE:     Most Recent Vitals:  Temp: 99.2 °F (37.3 °C) (09/30/17 1807)  Pulse: 87 (09/30/17 1901)  Resp: 18 (09/30/17 1901)  BP: (!) 112/59 (09/30/17 1901)  SpO2: 99 % (09/30/17 1901)      PHYSICAL EXAM:  AAO, NAD, well developed and well nourished.  Head normocephalic, atraumatic.  Trachea midline, neck supple.  Respirations unlabored with good inspiratory effort.  Heart regular rate and rhythm.  Abdomen soft, thin, nondistended, significantly tender to palpation with focal peritonitis over lower mid abdomen.      LABORATORY VALUES:    Recent Labs  Lab 09/30/17  1526   WBC 14.65*   HGB 15.5   HCT 43.7        Recent Labs  Lab 09/30/17  1526   *   K 3.8   CL 99   CO2 22*   BUN 16   CREATININE 1.2   *   CALCIUM 10.3   AST 16   ALT 16   ALKPHOS 59   BILITOT 1.6*   PROT 8.4   ALBUMIN 4.0   LIPASE 4     Recent Labs  Lab 09/30/17  1622   LACTATE 1.9   No results for input(s): PH, PCO2, PO2, HCO3 in the last 72 hours.    Recent Labs  Lab 09/30/17 1906   COLORU Yellow   APPEARANCEUA Clear   PHUR 6.0   SPECGRAV >=1.030*   RBCUA 4   WBCUA 1   BACTERIA None   OCCULTUA 1+*   LEUKOCYTESUR Negative   NITRITE Negative   PROTEINUA 1+*   GLUCUA Negative   KETONESU Negative   BILIRUBINUA Negative   UROBILINOGEN Negative     No results for input(s): LABURIN in the last 168 hours.No results for input(s): LABBLOO in the last 168 hours.  No results for input(s): LABURIN,  MAHIN in the last 168 hours.    DIAGNOSTIC STUDIES:  CT: Reviewed    ASSESSMENT:     Reza Meyer is a 21 y.o. male who is currently being admitted for Perforated appendicitis [K35.2].      PLAN:  · Admit to Surgery under Dr. Keyes.  · IV Cipro and Flagyl.  · Another 1L bolus of NS.  · NPO and IV fluids.  · Trend daily labs.  · Prophylaxis:  Pepcid 20 mg IV BID and Lovenox 40 mg SC daily.  · Discussed with Dr. Keyes who agrees with above plan.       My Dennison MD  General Surgery, PGY-5  Pager: [phone]  Cell: [phone]

## 2017-10-01 NOTE — ASSESSMENT & PLAN NOTE
Reza Betsy is a 21 y.o. male with perforated appendicitis    Regular diet  IVF  DVT and GI ppx  Prn pain meds  Ambulate  IV abx  Once clinical symptoms resolve will DC home with abx with plans for interval appendectomy

## 2017-10-01 NOTE — ED NOTES
Patient transported to floor with transport team, via stretcher, with personal belongings, with fluids continuing to infuse

## 2017-10-02 ENCOUNTER — ANESTHESIA EVENT (OUTPATIENT)
Dept: SURGERY | Facility: HOSPITAL | Age: 22
DRG: 340 | End: 2017-10-02
Payer: COMMERCIAL

## 2017-10-02 LAB
ALBUMIN SERPL BCP-MCNC: 2.7 G/DL
ALP SERPL-CCNC: 54 U/L
ALT SERPL W/O P-5'-P-CCNC: 10 U/L
ANION GAP SERPL CALC-SCNC: 6 MMOL/L
AST SERPL-CCNC: 8 U/L
BASOPHILS # BLD AUTO: 0.01 K/UL
BASOPHILS NFR BLD: 0.1 %
BILIRUB SERPL-MCNC: 0.5 MG/DL
BUN SERPL-MCNC: 12 MG/DL
CALCIUM SERPL-MCNC: 9.1 MG/DL
CHLORIDE SERPL-SCNC: 98 MMOL/L
CO2 SERPL-SCNC: 26 MMOL/L
CREAT SERPL-MCNC: 0.8 MG/DL
DIFFERENTIAL METHOD: ABNORMAL
EOSINOPHIL # BLD AUTO: 0 K/UL
EOSINOPHIL NFR BLD: 0 %
ERYTHROCYTE [DISTWIDTH] IN BLOOD BY AUTOMATED COUNT: 13.9 %
EST. GFR  (AFRICAN AMERICAN): >60 ML/MIN/1.73 M^2
EST. GFR  (NON AFRICAN AMERICAN): >60 ML/MIN/1.73 M^2
GLUCOSE SERPL-MCNC: 119 MG/DL
HCT VFR BLD AUTO: 35.7 %
HGB BLD-MCNC: 12.5 G/DL
LYMPHOCYTES # BLD AUTO: 0.4 K/UL
LYMPHOCYTES NFR BLD: 3.7 %
MCH RBC QN AUTO: 29.2 PG
MCHC RBC AUTO-ENTMCNC: 35 G/DL
MCV RBC AUTO: 83 FL
MONOCYTES # BLD AUTO: 0.8 K/UL
MONOCYTES NFR BLD: 8.7 %
NEUTROPHILS # BLD AUTO: 8.1 K/UL
NEUTROPHILS NFR BLD: 87.2 %
PLATELET # BLD AUTO: 142 K/UL
PMV BLD AUTO: 10.8 FL
POTASSIUM SERPL-SCNC: 3.5 MMOL/L
PROT SERPL-MCNC: 6.2 G/DL
RBC # BLD AUTO: 4.28 M/UL
SODIUM SERPL-SCNC: 130 MMOL/L
WBC # BLD AUTO: 9.34 K/UL

## 2017-10-02 PROCEDURE — 11000001 HC ACUTE MED/SURG PRIVATE ROOM

## 2017-10-02 PROCEDURE — 25000003 PHARM REV CODE 250: Performed by: SURGERY

## 2017-10-02 PROCEDURE — 25000003 PHARM REV CODE 250: Performed by: STUDENT IN AN ORGANIZED HEALTH CARE EDUCATION/TRAINING PROGRAM

## 2017-10-02 PROCEDURE — 85025 COMPLETE CBC W/AUTO DIFF WBC: CPT

## 2017-10-02 PROCEDURE — 25500020 PHARM REV CODE 255: Performed by: SURGERY

## 2017-10-02 PROCEDURE — S0030 INJECTION, METRONIDAZOLE: HCPCS | Performed by: SURGERY

## 2017-10-02 PROCEDURE — S0028 INJECTION, FAMOTIDINE, 20 MG: HCPCS | Performed by: SURGERY

## 2017-10-02 PROCEDURE — 36415 COLL VENOUS BLD VENIPUNCTURE: CPT

## 2017-10-02 PROCEDURE — 80053 COMPREHEN METABOLIC PANEL: CPT

## 2017-10-02 PROCEDURE — 63600175 PHARM REV CODE 636 W HCPCS: Performed by: SURGERY

## 2017-10-02 PROCEDURE — 25500020 PHARM REV CODE 255: Performed by: RADIOLOGY

## 2017-10-02 PROCEDURE — 63600175 PHARM REV CODE 636 W HCPCS: Performed by: STUDENT IN AN ORGANIZED HEALTH CARE EDUCATION/TRAINING PROGRAM

## 2017-10-02 RX ADMIN — HYDROMORPHONE HYDROCHLORIDE 0.5 MG: 1 INJECTION, SOLUTION INTRAMUSCULAR; INTRAVENOUS; SUBCUTANEOUS at 09:10

## 2017-10-02 RX ADMIN — VANCOMYCIN HYDROCHLORIDE 1000 MG: 1 INJECTION, POWDER, LYOPHILIZED, FOR SOLUTION INTRAVENOUS at 07:10

## 2017-10-02 RX ADMIN — METRONIDAZOLE 500 MG: 500 INJECTION, SOLUTION INTRAVENOUS at 11:10

## 2017-10-02 RX ADMIN — METRONIDAZOLE 500 MG: 500 INJECTION, SOLUTION INTRAVENOUS at 01:10

## 2017-10-02 RX ADMIN — ONDANSETRON 8 MG: 2 INJECTION INTRAMUSCULAR; INTRAVENOUS at 10:10

## 2017-10-02 RX ADMIN — ONDANSETRON 8 MG: 2 INJECTION INTRAMUSCULAR; INTRAVENOUS at 07:10

## 2017-10-02 RX ADMIN — IOHEXOL 75 ML: 350 INJECTION, SOLUTION INTRAVENOUS at 08:10

## 2017-10-02 RX ADMIN — IOHEXOL 15 ML: 350 INJECTION, SOLUTION INTRAVENOUS at 05:10

## 2017-10-02 RX ADMIN — CIPROFLOXACIN 400 MG: 2 INJECTION, SOLUTION INTRAVENOUS at 10:10

## 2017-10-02 RX ADMIN — PROMETHAZINE HYDROCHLORIDE 12.5 MG: 25 INJECTION INTRAMUSCULAR; INTRAVENOUS at 05:10

## 2017-10-02 RX ADMIN — IOHEXOL 15 ML: 350 INJECTION, SOLUTION INTRAVENOUS at 06:10

## 2017-10-02 RX ADMIN — VANCOMYCIN HYDROCHLORIDE 1000 MG: 1 INJECTION, POWDER, LYOPHILIZED, FOR SOLUTION INTRAVENOUS at 09:10

## 2017-10-02 RX ADMIN — METRONIDAZOLE 500 MG: 500 INJECTION, SOLUTION INTRAVENOUS at 06:10

## 2017-10-02 RX ADMIN — ENOXAPARIN SODIUM 40 MG: 100 INJECTION SUBCUTANEOUS at 04:10

## 2017-10-02 RX ADMIN — FAMOTIDINE 20 MG: 10 INJECTION, SOLUTION INTRAVENOUS at 09:10

## 2017-10-02 RX ADMIN — HYDROMORPHONE HYDROCHLORIDE 0.5 MG: 1 INJECTION, SOLUTION INTRAMUSCULAR; INTRAVENOUS; SUBCUTANEOUS at 10:10

## 2017-10-02 RX ADMIN — HYDROMORPHONE HYDROCHLORIDE 0.5 MG: 1 INJECTION, SOLUTION INTRAMUSCULAR; INTRAVENOUS; SUBCUTANEOUS at 06:10

## 2017-10-02 RX ADMIN — HYDROMORPHONE HYDROCHLORIDE 0.5 MG: 1 INJECTION, SOLUTION INTRAMUSCULAR; INTRAVENOUS; SUBCUTANEOUS at 04:10

## 2017-10-02 RX ADMIN — ZOLPIDEM TARTRATE 5 MG: 5 TABLET, FILM COATED ORAL at 09:10

## 2017-10-02 NOTE — ASSESSMENT & PLAN NOTE
Reza Betsy is a 21 y.o. male with perforated appendicitis    Clears for now, ADAT  IVF  DVT and GI ppx  Prn pain meds  Ambulate  IV abx  Transition to PO abx prior to discharge with plan for interval appendectomy

## 2017-10-02 NOTE — ANESTHESIA PREPROCEDURE EVALUATION
Pre-operative evaluation for Procedure(s) (LRB):  APPENDECTOMY-LAPAROSCOPIC (N/A)    Reza Meyer is a 21 y.o. male  who presents to [facility] on 09/30/2017 with perforated appendicitis. The patient presented with lower abdominal pain since he woke up on Thursday morning. He had nausea and vomiting as well. Initially sought help at [facility] and was told he likely had constipation and gas.       Initial temperature of 101.1 in ED and tachycardic. CT consistent with perforated appendicitis.    Pt remains on abx and scheduled for above procedure tomorrow.     LDA:   L AC 18 g PIV     Prev airway: none on record       Patient Active Problem List   Diagnosis    Perforated appendicitis       Review of patient's allergies indicates:  No Known Allergies     No current facility-administered medications on file prior to encounter.      No current outpatient prescriptions on file prior to encounter.       Past Surgical History:   Procedure Laterality Date    MOUTH SURGERY         Social History     Social History    Marital status: Single     Spouse name: N/A    Number of children: N/A    Years of education: N/A     Occupational History    Not on file.     Social History Main Topics    Smoking status: Never Smoker    Smokeless tobacco: Never Used    Alcohol use 6.0 oz/week     10 Cans of beer per week    Drug use: No    Sexual activity: Not on file     Other Topics Concern    Not on file     Social History Narrative    No narrative on file         Vital Signs Range (Last 24H):  Temp:  [36.8 °C (98.2 °F)-38.1 °C (100.5 °F)]   Pulse:  []   Resp:  [16-18]   BP: (117-133)/(64-76)   SpO2:  [95 %-99 %]       CBC:   Recent Labs      10/01/17   0442  10/02/17   0548   WBC  12.57  9.34   RBC  4.36*  4.28*   HGB  12.4*  12.5*   HCT  36.6*  35.7*   PLT  154  142*   MCV  84  83   MCH  28.4  29.2   MCHC  33.9  35.0       CMP:   Recent Labs       10/01/17   0442  10/02/17   0548   NA  134*  130*   K  3.5  3.5   CL  102  98   CO2  22*  26   BUN  14  12   CREATININE  0.9  0.8   GLU  117*  119*   CALCIUM  9.5  9.1   ALBUMIN  3.0*  2.7*   PROT  6.5  6.2   ALKPHOS  55  54*   ALT  11  10   AST  11  8*   BILITOT  0.9  0.5       INR  No results for input(s): INR, PROTIME, APTT in the last 72 hours.    Invalid input(s): PT        Diagnostic Studies:      EKG:  Vent. Rate : 102 BPM     Atrial Rate : 102 BPM     P-R Int : 140 ms          QRS Dur : 098 ms      QT Int : 312 ms       P-R-T Axes : 048 087 060 degrees     QTc Int : 406 ms      Sinus tachycardia  Otherwise normal ECG  No previous ECGs available  Confirmed by JARETT LOCKWOOD MD ([id number]) on 10/2/2017 10:10:08 AM    Referred By: [email or web address]   SELF           Confirmed By:JARETT LOCKWOOD MD    2D Echo:  None on record       Anesthesia Evaluation    I have reviewed the Patient Summary Reports.     I have reviewed the Medications.     Review of Systems  Anesthesia Hx:  No problems with previous Anesthesia Denies Hx of Anesthetic complications  History of prior surgery of interest to airway management or planning:  Denies Personal Hx of Anesthesia complications.   Hematology/Oncology:     Oncology Normal    -- Anemia:   EENT/Dental:EENT/Dental Normal   Cardiovascular:  Cardiovascular Normal     Pulmonary:  Pulmonary Normal    Renal/:  Renal/ Normal     Hepatic/GI:   Perforated appendicitis    Musculoskeletal:  Musculoskeletal Normal    Neurological:  Neurology Normal    Endocrine:  Endocrine Normal    Dermatological:  Skin Normal    Psych:  Psychiatric Normal           Physical Exam  General:  Well nourished    Airway/Jaw/Neck:  Airway Findings: Mouth Opening: Normal Tongue: Normal  General Airway Assessment: Adult  Mallampati: II  Improves to I with phonation.  TM Distance: Normal, at least 6 cm  Jaw/Neck Findings:  Neck ROM: Normal ROM     Eyes/Ears/Nose:  EYES/EARS/NOSE FINDINGS: Normal   Dental:  Dental Findings: In tact    Chest/Lungs:  Chest/Lungs Findings: Clear to auscultation         Mental Status:  Mental Status Findings:  Cooperative, Alert and Oriented         Anesthesia Plan  Type of Anesthesia, risks & benefits discussed:  Anesthesia Type:  general  Patient's Preference:   Intra-op Monitoring Plan: standard ASA monitors  Intra-op Monitoring Plan Comments:   Post Op Pain Control Plan:   Post Op Pain Control Plan Comments:   Induction:   IV  Beta Blocker:  Patient is not currently on a Beta-Blocker (No further documentation required).       Informed Consent: Patient understands risks and agrees with Anesthesia plan.  Questions answered. Anesthesia consent signed with patient.  ASA Score: 2     Day of Surgery Review of History & Physical:    H&P update referred to the surgeon.         Ready For Surgery From Anesthesia Perspective.

## 2017-10-02 NOTE — SUBJECTIVE & OBJECTIVE
Interval History:   Had some emesis overnight, abdominal pain remains mild but present   + flatus and bowel movement  Afebrile. VSS    Medications:  Continuous Infusions:   lactated ringers 125 mL/hr at 09/30/17 2037     Scheduled Meds:   ciprofloxacin  400 mg Intravenous Q12H    enoxaparin  40 mg Subcutaneous Q24H    famotidine (PF)  20 mg Intravenous BID    ketorolac  15 mg Intravenous Once    metronidazole  500 mg Intravenous Q8H    sodium chloride 0.9%  1,000 mL Intravenous Once    vancomycin (VANCOCIN) IVPB  1,000 mg Intravenous Q12H     PRN Meds:acetaminophen, albuterol-ipratropium 2.5mg-0.5mg/3mL, diphenhydrAMINE, HYDROmorphone, ondansetron, oxycodone-acetaminophen, oxycodone-acetaminophen, promethazine (PHENERGAN) IVPB, zolpidem     Review of patient's allergies indicates:  No Known Allergies  Objective:     Vital Signs (Most Recent):  Temp: 98.7 °F (37.1 °C) (10/02/17 0743)  Pulse: 87 (10/02/17 0743)  Resp: 17 (10/02/17 0743)  BP: 117/73 (10/02/17 0743)  SpO2: 97 % (10/02/17 0743) Vital Signs (24h Range):  Temp:  [98.2 °F (36.8 °C)-100.8 °F (38.2 °C)] 98.7 °F (37.1 °C)  Pulse:  [] 87  Resp:  [16-18] 17  SpO2:  [95 %-99 %] 97 %  BP: (117-133)/(60-76) 117/73     Weight: 77.1 kg (170 lb)  Body mass index is 19.65 kg/m².    Intake/Output - Last 3 Shifts       09/30 0700 - 10/01 0659 10/01 0700 - 10/02 0659 10/02 0700 - 10/03 0659    P.O.  600     I.V. (mL/kg)  2500 (32.4)     IV Piggyback 1100 400     Total Intake(mL/kg) 1100 (14.3) 3500 (45.4)     Net +1100 +3500             Urine Occurrence  4 x     Stool Occurrence  2 x     Emesis Occurrence  0 x           Physical Exam   Constitutional: He is oriented to person, place, and time. He appears well-developed and well-nourished. No distress.   HENT:   Head: Normocephalic and atraumatic.   Eyes: No scleral icterus.   Neck: No tracheal deviation present.   Cardiovascular: Normal rate.    Pulmonary/Chest: Effort normal. No respiratory distress.    Abdominal: Soft. He exhibits no distension. There is no guarding.   Mild diffuse tenderness   Neurological: He is alert and oriented to person, place, and time. No cranial nerve deficit.   Skin: Skin is warm. No erythema.   Psychiatric: He has a normal mood and affect. His behavior is normal.   Nursing note and vitals reviewed.      Significant Labs:  CBC:     Recent Labs  Lab 10/02/17  0548   WBC 9.34   RBC 4.28*   HGB 12.5*   HCT 35.7*   *   MCV 83   MCH 29.2   MCHC 35.0     CMP:     Recent Labs  Lab 10/02/17  0548   *   CALCIUM 9.1   ALBUMIN 2.7*   PROT 6.2   *   K 3.5   CO2 26   CL 98   BUN 12   CREATININE 0.8   ALKPHOS 54*   ALT 10   AST 8*   BILITOT 0.5       Significant Diagnostics:  CT: I have reviewed all pertinent results/findings within the past 24 hours and my personal findings are:  perforated appendicitis

## 2017-10-02 NOTE — PLAN OF CARE
Patient lives in a apartment/on [facility]. Discharge plan listed below. Per Dr. BARTOLO Kohli, plans to discharge patient on oral antibiotics tomorrow.     His PCP: Dr. Pablo Jones in [place].   (Address: [address], [place] [address]   Phone: [phone])          10/02/17 1240   Discharge Assessment   Assessment Type Discharge Planning Assessment   Confirmed/corrected address and phone number on facesheet? Yes   Assessment information obtained from? Patient;Medical Record   Expected Length of Stay (days) (3)   Communicated expected length of stay with patient/caregiver no  (Per MD)   Prior to hospitilization cognitive status: Alert/Oriented;No Deficits   Prior to hospitalization functional status: Independent   Current cognitive status: Alert/Oriented   Current Functional Status: Independent   Facility Arrived From: (N/A)   Lives With (Lives on [facility] in [place])   Able to Return to Prior Arrangements yes   Is patient able to care for self after discharge? Yes   Who are your caregiver(s) and their phone number(s)? (Mother: Jo MILLIGAN [phone], Father: Karol MILLIGAN [phone]. )   Patient's perception of discharge disposition other (comments)  ([facility] w/parents vs going home to [place] w/parents (see address in demographics.))   Patient currently being followed by outpatient case management? No   Patient currently receives any other outside agency services? No   Equipment Currently Used at Home none   Do you have any problems affording any of your prescribed medications? No   Is the patient taking medications as prescribed? yes   Does the patient have transportation home? Yes   Transportation Available car;family or friend will provide   Dialysis Name and Scheduled days (N/A)   Does the patient receive services at the [facility]? No   Discharge Plan A Other  ([facility] w/parents)   Discharge Plan B Home with family  (Back to home in [place])   Patient/Family In  Agreement With Plan yes

## 2017-10-02 NOTE — PROGRESS NOTES
[facility]  General Surgery  Progress Note    Subjective:     History of Present Illness:  No notes on file    Post-Op Info:  * No surgery found *         Interval History:   Had some emesis overnight, abdominal pain remains mild but present   + flatus and bowel movement  Afebrile. VSS    Medications:  Continuous Infusions:   lactated ringers 125 mL/hr at 09/30/17 2037     Scheduled Meds:   ciprofloxacin  400 mg Intravenous Q12H    enoxaparin  40 mg Subcutaneous Q24H    famotidine (PF)  20 mg Intravenous BID    ketorolac  15 mg Intravenous Once    metronidazole  500 mg Intravenous Q8H    sodium chloride 0.9%  1,000 mL Intravenous Once    vancomycin (VANCOCIN) IVPB  1,000 mg Intravenous Q12H     PRN Meds:acetaminophen, albuterol-ipratropium 2.5mg-0.5mg/3mL, diphenhydrAMINE, HYDROmorphone, ondansetron, oxycodone-acetaminophen, oxycodone-acetaminophen, promethazine (PHENERGAN) IVPB, zolpidem     Review of patient's allergies indicates:  No Known Allergies  Objective:     Vital Signs (Most Recent):  Temp: 98.7 °F (37.1 °C) (10/02/17 0743)  Pulse: 87 (10/02/17 0743)  Resp: 17 (10/02/17 0743)  BP: 117/73 (10/02/17 0743)  SpO2: 97 % (10/02/17 0743) Vital Signs (24h Range):  Temp:  [98.2 °F (36.8 °C)-100.8 °F (38.2 °C)] 98.7 °F (37.1 °C)  Pulse:  [] 87  Resp:  [16-18] 17  SpO2:  [95 %-99 %] 97 %  BP: (117-133)/(60-76) 117/73     Weight: 77.1 kg (170 lb)  Body mass index is 19.65 kg/m².    Intake/Output - Last 3 Shifts       09/30 0700 - 10/01 0659 10/01 0700 - 10/02 0659 10/02 0700 - 10/03 0659    P.O.  600     I.V. (mL/kg)  2500 (32.4)     IV Piggyback 1100 400     Total Intake(mL/kg) 1100 (14.3) 3500 (45.4)     Net +1100 +3500             Urine Occurrence  4 x     Stool Occurrence  2 x     Emesis Occurrence  0 x           Physical Exam   Constitutional: He is oriented to person, place, and time. He appears well-developed and well-nourished. No distress.   HENT:   Head: Normocephalic and  atraumatic.   Eyes: No scleral icterus.   Neck: No tracheal deviation present.   Cardiovascular: Normal rate.    Pulmonary/Chest: Effort normal. No respiratory distress.   Abdominal: Soft. He exhibits no distension. There is no guarding.   Mild diffuse tenderness   Neurological: He is alert and oriented to person, place, and time. No cranial nerve deficit.   Skin: Skin is warm. No erythema.   Psychiatric: He has a normal mood and affect. His behavior is normal.   Nursing note and vitals reviewed.      Significant Labs:  CBC:     Recent Labs  Lab 10/02/17  0548   WBC 9.34   RBC 4.28*   HGB 12.5*   HCT 35.7*   *   MCV 83   MCH 29.2   MCHC 35.0     CMP:     Recent Labs  Lab 10/02/17  0548   *   CALCIUM 9.1   ALBUMIN 2.7*   PROT 6.2   *   K 3.5   CO2 26   CL 98   BUN 12   CREATININE 0.8   ALKPHOS 54*   ALT 10   AST 8*   BILITOT 0.5       Significant Diagnostics:  CT: I have reviewed all pertinent results/findings within the past 24 hours and my personal findings are:  perforated appendicitis    Assessment/Plan:     * Perforated appendicitis    Reza Meyer is a 21 y.o. male with perforated appendicitis    Clears for now, ADAT  IVF  DVT and GI ppx  Prn pain meds  Ambulate  IV abx  Transition to PO abx prior to discharge with plan for interval appendectomy              Arturo Wu MD  General Surgery  [facility]

## 2017-10-02 NOTE — PLAN OF CARE
Problem: Patient Care Overview  Goal: Plan of Care Review  Outcome: Ongoing (interventions implemented as appropriate)  Patient AAOX3, VSS. Family at bedside. Two side rails up, bed locked, call light within reach. No falls noted as these precautions remain. Patient is free of skin breakdown as patient moves well independently.  IV fluids adjusted per orders. IV ABX in use. Pt vomitted once this morning, green/yellow in color.. Hourly rounds made and no complaints at this time noted. Will resume with plan of care.

## 2017-10-03 ENCOUNTER — ANESTHESIA (OUTPATIENT)
Dept: SURGERY | Facility: HOSPITAL | Age: 22
DRG: 340 | End: 2017-10-03
Payer: COMMERCIAL

## 2017-10-03 PROBLEM — R00.0 TACHYCARDIA WITH HEART RATE 100-120 BEATS PER MINUTE: Status: ACTIVE | Noted: 2017-10-03

## 2017-10-03 LAB
ALBUMIN SERPL BCP-MCNC: 2.4 G/DL
ALP SERPL-CCNC: 50 U/L
ALT SERPL W/O P-5'-P-CCNC: 8 U/L
ANION GAP SERPL CALC-SCNC: 10 MMOL/L
AST SERPL-CCNC: 8 U/L
BACTERIA BLD CULT: NORMAL
BASOPHILS # BLD AUTO: 0.01 K/UL
BASOPHILS NFR BLD: 0.1 %
BILIRUB SERPL-MCNC: 0.3 MG/DL
BUN SERPL-MCNC: 11 MG/DL
CALCIUM SERPL-MCNC: 8.7 MG/DL
CHLORIDE SERPL-SCNC: 98 MMOL/L
CO2 SERPL-SCNC: 27 MMOL/L
CREAT SERPL-MCNC: 0.7 MG/DL
DIFFERENTIAL METHOD: ABNORMAL
EOSINOPHIL # BLD AUTO: 0.1 K/UL
EOSINOPHIL NFR BLD: 0.8 %
ERYTHROCYTE [DISTWIDTH] IN BLOOD BY AUTOMATED COUNT: 13.9 %
EST. GFR  (AFRICAN AMERICAN): >60 ML/MIN/1.73 M^2
EST. GFR  (NON AFRICAN AMERICAN): >60 ML/MIN/1.73 M^2
GLUCOSE SERPL-MCNC: 111 MG/DL
HCT VFR BLD AUTO: 34 %
HGB BLD-MCNC: 11.9 G/DL
LYMPHOCYTES # BLD AUTO: 0.6 K/UL
LYMPHOCYTES NFR BLD: 6.9 %
MCH RBC QN AUTO: 29 PG
MCHC RBC AUTO-ENTMCNC: 35 G/DL
MCV RBC AUTO: 83 FL
MONOCYTES # BLD AUTO: 0.9 K/UL
MONOCYTES NFR BLD: 10.5 %
NEUTROPHILS # BLD AUTO: 7.1 K/UL
NEUTROPHILS NFR BLD: 81.2 %
PLATELET # BLD AUTO: 165 K/UL
PMV BLD AUTO: 9.8 FL
POTASSIUM SERPL-SCNC: 3.3 MMOL/L
PROT SERPL-MCNC: 5.7 G/DL
RBC # BLD AUTO: 4.1 M/UL
SODIUM SERPL-SCNC: 135 MMOL/L
VANCOMYCIN TROUGH SERPL-MCNC: 2 UG/ML
WBC # BLD AUTO: 8.79 K/UL

## 2017-10-03 PROCEDURE — 44970 LAPAROSCOPY APPENDECTOMY: CPT | Mod: ,,, | Performed by: SURGERY

## 2017-10-03 PROCEDURE — S0030 INJECTION, METRONIDAZOLE: HCPCS | Performed by: SURGERY

## 2017-10-03 PROCEDURE — 25000003 PHARM REV CODE 250

## 2017-10-03 PROCEDURE — 80053 COMPREHEN METABOLIC PANEL: CPT

## 2017-10-03 PROCEDURE — 63600175 PHARM REV CODE 636 W HCPCS: Performed by: SURGERY

## 2017-10-03 PROCEDURE — 25000003 PHARM REV CODE 250: Performed by: SURGERY

## 2017-10-03 PROCEDURE — 36000709 HC OR TIME LEV III EA ADD 15 MIN: Performed by: SURGERY

## 2017-10-03 PROCEDURE — 11000001 HC ACUTE MED/SURG PRIVATE ROOM

## 2017-10-03 PROCEDURE — 36415 COLL VENOUS BLD VENIPUNCTURE: CPT

## 2017-10-03 PROCEDURE — 27201423 OPTIME MED/SURG SUP & DEVICES STERILE SUPPLY: Performed by: SURGERY

## 2017-10-03 PROCEDURE — 63600175 PHARM REV CODE 636 W HCPCS: Performed by: STUDENT IN AN ORGANIZED HEALTH CARE EDUCATION/TRAINING PROGRAM

## 2017-10-03 PROCEDURE — 0DTJ4ZZ RESECTION OF APPENDIX, PERCUTANEOUS ENDOSCOPIC APPROACH: ICD-10-PCS | Performed by: SURGERY

## 2017-10-03 PROCEDURE — 3E1M38Z IRRIGATION OF PERITONEAL CAVITY USING IRRIGATING SUBSTANCE, PERCUTANEOUS APPROACH: ICD-10-PCS | Performed by: SURGERY

## 2017-10-03 PROCEDURE — 36000708 HC OR TIME LEV III 1ST 15 MIN: Performed by: SURGERY

## 2017-10-03 PROCEDURE — 88304 TISSUE EXAM BY PATHOLOGIST: CPT | Performed by: PATHOLOGY

## 2017-10-03 PROCEDURE — S0028 INJECTION, FAMOTIDINE, 20 MG: HCPCS | Performed by: SURGERY

## 2017-10-03 PROCEDURE — 37000008 HC ANESTHESIA 1ST 15 MINUTES: Performed by: SURGERY

## 2017-10-03 PROCEDURE — 71000033 HC RECOVERY, INTIAL HOUR: Performed by: SURGERY

## 2017-10-03 PROCEDURE — 25000003 PHARM REV CODE 250: Performed by: NURSE ANESTHETIST, CERTIFIED REGISTERED

## 2017-10-03 PROCEDURE — D9220A PRA ANESTHESIA: Mod: ANES,,, | Performed by: ANESTHESIOLOGY

## 2017-10-03 PROCEDURE — 25000003 PHARM REV CODE 250: Performed by: STUDENT IN AN ORGANIZED HEALTH CARE EDUCATION/TRAINING PROGRAM

## 2017-10-03 PROCEDURE — 85025 COMPLETE CBC W/AUTO DIFF WBC: CPT

## 2017-10-03 PROCEDURE — 88304 TISSUE EXAM BY PATHOLOGIST: CPT | Mod: 26,,, | Performed by: PATHOLOGY

## 2017-10-03 PROCEDURE — 63600175 PHARM REV CODE 636 W HCPCS: Performed by: NURSE ANESTHETIST, CERTIFIED REGISTERED

## 2017-10-03 PROCEDURE — 37000009 HC ANESTHESIA EA ADD 15 MINS: Performed by: SURGERY

## 2017-10-03 PROCEDURE — D9220A PRA ANESTHESIA: Mod: CRNA,,, | Performed by: NURSE ANESTHETIST, CERTIFIED REGISTERED

## 2017-10-03 PROCEDURE — 71000039 HC RECOVERY, EACH ADD'L HOUR: Performed by: SURGERY

## 2017-10-03 PROCEDURE — 80202 ASSAY OF VANCOMYCIN: CPT

## 2017-10-03 RX ORDER — GLYCOPYRROLATE 0.2 MG/ML
INJECTION INTRAMUSCULAR; INTRAVENOUS
Status: DISCONTINUED | OUTPATIENT
Start: 2017-10-03 | End: 2017-10-03

## 2017-10-03 RX ORDER — LIDOCAINE HCL/PF 100 MG/5ML
SYRINGE (ML) INTRAVENOUS
Status: DISCONTINUED | OUTPATIENT
Start: 2017-10-03 | End: 2017-10-03

## 2017-10-03 RX ORDER — SODIUM CHLORIDE 9 MG/ML
INJECTION, SOLUTION INTRAVENOUS CONTINUOUS PRN
Status: DISCONTINUED | OUTPATIENT
Start: 2017-10-03 | End: 2017-10-03

## 2017-10-03 RX ORDER — HYDROMORPHONE HYDROCHLORIDE 1 MG/ML
0.2 INJECTION, SOLUTION INTRAMUSCULAR; INTRAVENOUS; SUBCUTANEOUS EVERY 5 MIN PRN
Status: COMPLETED | OUTPATIENT
Start: 2017-10-03 | End: 2017-10-03

## 2017-10-03 RX ORDER — PROPOFOL 10 MG/ML
INJECTION, EMULSION INTRAVENOUS
Status: DISCONTINUED | OUTPATIENT
Start: 2017-10-03 | End: 2017-10-03

## 2017-10-03 RX ORDER — ACETAMINOPHEN 10 MG/ML
INJECTION, SOLUTION INTRAVENOUS
Status: DISCONTINUED | OUTPATIENT
Start: 2017-10-03 | End: 2017-10-03

## 2017-10-03 RX ORDER — ONDANSETRON 2 MG/ML
INJECTION INTRAMUSCULAR; INTRAVENOUS
Status: DISCONTINUED | OUTPATIENT
Start: 2017-10-03 | End: 2017-10-03

## 2017-10-03 RX ORDER — FENTANYL CITRATE 50 UG/ML
INJECTION, SOLUTION INTRAMUSCULAR; INTRAVENOUS
Status: DISCONTINUED | OUTPATIENT
Start: 2017-10-03 | End: 2017-10-03

## 2017-10-03 RX ORDER — ROCURONIUM BROMIDE 10 MG/ML
INJECTION, SOLUTION INTRAVENOUS
Status: DISCONTINUED | OUTPATIENT
Start: 2017-10-03 | End: 2017-10-03

## 2017-10-03 RX ORDER — MIDAZOLAM HYDROCHLORIDE 1 MG/ML
INJECTION, SOLUTION INTRAMUSCULAR; INTRAVENOUS
Status: DISCONTINUED | OUTPATIENT
Start: 2017-10-03 | End: 2017-10-03

## 2017-10-03 RX ORDER — HYDROMORPHONE HYDROCHLORIDE 1 MG/ML
0.2 INJECTION, SOLUTION INTRAMUSCULAR; INTRAVENOUS; SUBCUTANEOUS EVERY 5 MIN PRN
Status: DISCONTINUED | OUTPATIENT
Start: 2017-10-03 | End: 2017-10-03 | Stop reason: HOSPADM

## 2017-10-03 RX ORDER — SODIUM CHLORIDE 0.9 % (FLUSH) 0.9 %
3 SYRINGE (ML) INJECTION
Status: DISCONTINUED | OUTPATIENT
Start: 2017-10-03 | End: 2017-10-06 | Stop reason: HOSPADM

## 2017-10-03 RX ORDER — CELECOXIB 200 MG/1
200 CAPSULE ORAL 2 TIMES DAILY
Status: DISCONTINUED | OUTPATIENT
Start: 2017-10-03 | End: 2017-10-06 | Stop reason: HOSPADM

## 2017-10-03 RX ORDER — POTASSIUM CHLORIDE 20 MEQ/1
60 TABLET, EXTENDED RELEASE ORAL ONCE
Status: COMPLETED | OUTPATIENT
Start: 2017-10-03 | End: 2017-10-03

## 2017-10-03 RX ORDER — ONDANSETRON 2 MG/ML
4 INJECTION INTRAMUSCULAR; INTRAVENOUS DAILY PRN
Status: DISCONTINUED | OUTPATIENT
Start: 2017-10-03 | End: 2017-10-03

## 2017-10-03 RX ORDER — NEOSTIGMINE METHYLSULFATE 1 MG/ML
INJECTION, SOLUTION INTRAVENOUS
Status: DISCONTINUED | OUTPATIENT
Start: 2017-10-03 | End: 2017-10-03

## 2017-10-03 RX ORDER — SODIUM CHLORIDE 0.9 % (FLUSH) 0.9 %
3 SYRINGE (ML) INJECTION
Status: DISCONTINUED | OUTPATIENT
Start: 2017-10-03 | End: 2017-10-03

## 2017-10-03 RX ORDER — SUCCINYLCHOLINE CHLORIDE 20 MG/ML
INJECTION INTRAMUSCULAR; INTRAVENOUS
Status: DISCONTINUED | OUTPATIENT
Start: 2017-10-03 | End: 2017-10-03

## 2017-10-03 RX ORDER — BUPIVACAINE HYDROCHLORIDE 2.5 MG/ML
INJECTION, SOLUTION EPIDURAL; INFILTRATION; INTRACAUDAL
Status: DISCONTINUED | OUTPATIENT
Start: 2017-10-03 | End: 2017-10-03 | Stop reason: HOSPADM

## 2017-10-03 RX ADMIN — ONDANSETRON 8 MG: 2 INJECTION INTRAMUSCULAR; INTRAVENOUS at 08:10

## 2017-10-03 RX ADMIN — CIPROFLOXACIN 400 MG: 2 INJECTION, SOLUTION INTRAVENOUS at 10:10

## 2017-10-03 RX ADMIN — OXYCODONE HYDROCHLORIDE AND ACETAMINOPHEN 1 TABLET: 5; 325 TABLET ORAL at 04:10

## 2017-10-03 RX ADMIN — ENOXAPARIN SODIUM 40 MG: 100 INJECTION SUBCUTANEOUS at 04:10

## 2017-10-03 RX ADMIN — ROCURONIUM BROMIDE 10 MG: 10 INJECTION, SOLUTION INTRAVENOUS at 01:10

## 2017-10-03 RX ADMIN — ONDANSETRON 4 MG: 2 INJECTION INTRAMUSCULAR; INTRAVENOUS at 01:10

## 2017-10-03 RX ADMIN — HYDROMORPHONE HYDROCHLORIDE 0.5 MG: 1 INJECTION, SOLUTION INTRAMUSCULAR; INTRAVENOUS; SUBCUTANEOUS at 08:10

## 2017-10-03 RX ADMIN — POTASSIUM CHLORIDE 60 MEQ: 1500 TABLET, EXTENDED RELEASE ORAL at 04:10

## 2017-10-03 RX ADMIN — FAMOTIDINE 20 MG: 10 INJECTION, SOLUTION INTRAVENOUS at 08:10

## 2017-10-03 RX ADMIN — HYDROMORPHONE HYDROCHLORIDE 0.2 MG: 1 INJECTION, SOLUTION INTRAMUSCULAR; INTRAVENOUS; SUBCUTANEOUS at 04:10

## 2017-10-03 RX ADMIN — SODIUM CHLORIDE, SODIUM GLUCONATE, SODIUM ACETATE, POTASSIUM CHLORIDE, MAGNESIUM CHLORIDE, SODIUM PHOSPHATE, DIBASIC, AND POTASSIUM PHOSPHATE: .53; .5; .37; .037; .03; .012; .00082 INJECTION, SOLUTION INTRAVENOUS at 01:10

## 2017-10-03 RX ADMIN — HYDROMORPHONE HYDROCHLORIDE 0.5 MG: 1 INJECTION, SOLUTION INTRAMUSCULAR; INTRAVENOUS; SUBCUTANEOUS at 03:10

## 2017-10-03 RX ADMIN — SODIUM CHLORIDE, SODIUM GLUCONATE, SODIUM ACETATE, POTASSIUM CHLORIDE, MAGNESIUM CHLORIDE, SODIUM PHOSPHATE, DIBASIC, AND POTASSIUM PHOSPHATE: .53; .5; .37; .037; .03; .012; .00082 INJECTION, SOLUTION INTRAVENOUS at 02:10

## 2017-10-03 RX ADMIN — HYDROMORPHONE HYDROCHLORIDE 0.2 MG: 1 INJECTION, SOLUTION INTRAMUSCULAR; INTRAVENOUS; SUBCUTANEOUS at 03:10

## 2017-10-03 RX ADMIN — FENTANYL CITRATE 50 MCG: 50 INJECTION, SOLUTION INTRAMUSCULAR; INTRAVENOUS at 02:10

## 2017-10-03 RX ADMIN — VANCOMYCIN HYDROCHLORIDE 1000 MG: 1 INJECTION, POWDER, LYOPHILIZED, FOR SOLUTION INTRAVENOUS at 08:10

## 2017-10-03 RX ADMIN — SODIUM CHLORIDE: 0.9 INJECTION, SOLUTION INTRAVENOUS at 12:10

## 2017-10-03 RX ADMIN — CIPROFLOXACIN 400 MG: 2 INJECTION, SOLUTION INTRAVENOUS at 01:10

## 2017-10-03 RX ADMIN — METRONIDAZOLE 500 MG: 500 INJECTION, SOLUTION INTRAVENOUS at 06:10

## 2017-10-03 RX ADMIN — MIDAZOLAM HYDROCHLORIDE 2 MG: 1 INJECTION, SOLUTION INTRAMUSCULAR; INTRAVENOUS at 12:10

## 2017-10-03 RX ADMIN — OXYCODONE HYDROCHLORIDE AND ACETAMINOPHEN 1 TABLET: 10; 325 TABLET ORAL at 08:10

## 2017-10-03 RX ADMIN — FENTANYL CITRATE 100 MCG: 50 INJECTION, SOLUTION INTRAMUSCULAR; INTRAVENOUS at 12:10

## 2017-10-03 RX ADMIN — ZOLPIDEM TARTRATE 5 MG: 5 TABLET, FILM COATED ORAL at 08:10

## 2017-10-03 RX ADMIN — SODIUM CHLORIDE, SODIUM LACTATE, POTASSIUM CHLORIDE, AND CALCIUM CHLORIDE: 600; 310; 30; 20 INJECTION, SOLUTION INTRAVENOUS at 10:10

## 2017-10-03 RX ADMIN — CELECOXIB 200 MG: 200 CAPSULE ORAL at 08:10

## 2017-10-03 RX ADMIN — ONDANSETRON 8 MG: 2 INJECTION INTRAMUSCULAR; INTRAVENOUS at 04:10

## 2017-10-03 RX ADMIN — NEOSTIGMINE METHYLSULFATE 4 MG: 1 INJECTION INTRAVENOUS at 02:10

## 2017-10-03 RX ADMIN — METRONIDAZOLE 500 MG: 500 INJECTION, SOLUTION INTRAVENOUS at 10:10

## 2017-10-03 RX ADMIN — GLYCOPYRROLATE 0.6 MG: 0.2 INJECTION, SOLUTION INTRAMUSCULAR; INTRAVENOUS at 02:10

## 2017-10-03 RX ADMIN — ACETAMINOPHEN 1000 MG: 10 INJECTION, SOLUTION INTRAVENOUS at 01:10

## 2017-10-03 RX ADMIN — LIDOCAINE HYDROCHLORIDE 100 MG: 20 INJECTION, SOLUTION INTRAVENOUS at 12:10

## 2017-10-03 RX ADMIN — FENTANYL CITRATE 50 MCG: 50 INJECTION, SOLUTION INTRAMUSCULAR; INTRAVENOUS at 01:10

## 2017-10-03 RX ADMIN — ROCURONIUM BROMIDE 20 MG: 10 INJECTION, SOLUTION INTRAVENOUS at 12:10

## 2017-10-03 RX ADMIN — PROPOFOL 200 MG: 10 INJECTION, EMULSION INTRAVENOUS at 12:10

## 2017-10-03 RX ADMIN — SUCCINYLCHOLINE CHLORIDE 120 MG: 20 INJECTION, SOLUTION INTRAMUSCULAR; INTRAVENOUS at 12:10

## 2017-10-03 RX ADMIN — ROCURONIUM BROMIDE 20 MG: 10 INJECTION, SOLUTION INTRAVENOUS at 01:10

## 2017-10-03 RX ADMIN — ROCURONIUM BROMIDE 10 MG: 10 INJECTION, SOLUTION INTRAVENOUS at 12:10

## 2017-10-03 RX ADMIN — METRONIDAZOLE 500 MG: 500 INJECTION, SOLUTION INTRAVENOUS at 02:10

## 2017-10-03 NOTE — TRANSFER OF CARE
"Anesthesia Transfer of Care Note    Patient: Reza Meyer    Procedure(s) Performed: Procedure(s) (LRB):  APPENDECTOMY-LAPAROSCOPIC (N/A)    Patient location: PACU    Anesthesia Type: general    Transport from OR: Transported from OR on 6-10 L/min O2 by face mask with adequate spontaneous ventilation    Post pain: adequate analgesia    Post assessment: no apparent anesthetic complications    Post vital signs: stable    Level of consciousness: oriented and awake    Nausea/Vomiting: no nausea/vomiting    Complications: none    Transfer of care protocol was followed      Last vitals:   Visit Vitals  /60 (BP Location: Right arm, Patient Position: Lying)   Pulse 73   Temp 37.2 °C (98.9 °F) (Oral)   Resp 20   Ht 6' 6" (1.981 m)   Wt 77.1 kg (170 lb)   SpO2 99%   BMI 19.65 kg/m²     "

## 2017-10-03 NOTE — ASSESSMENT & PLAN NOTE
Reza Betsy is a 21 y.o. male with perforated appendicitis, planned for OR today for diagnostic lap, possible drain placement, possible appendectomy    NPO   IVF  DVT and GI ppx  Prn pain meds  Ambulate  IV abx  Will obtain consent this AM -- already seen by anesthesia

## 2017-10-03 NOTE — NURSING TRANSFER
Nursing Transfer Note      10/3/2017     Transfer To: 506    Transfer via bed    Transfer with ivf fluids will be restarted once back in his room.  ivf already set up in room 506    Transported by [email or web address]    Medicines sent: none    Chart send with patient: Yes    Notified: parents are going back to his room    Patient reassessed at: 10/3/17

## 2017-10-03 NOTE — PROGRESS NOTES
"[facility]  General Surgery  Progress Note    Subjective:     History of Present Illness:  Reza Meyer is a 21 y.o. male who presents to [facility] on 09/30/2017 with perforated appendicitis.     The patient reports lower abdominal pain since he woke up on Thursday morning.  He also describes what he thought was constipation from Wednesday until Friday then has been having diarrhea since then.  He has had nausea and vomiting and last vomited yesterday evening.  No prior episodes of similar symptoms.  Initially sought help at [facility] or something similar and was told he likely had constipation and gas.       Initially temperature of 101.1 in ED and tachycardic to 130, which has improved now in 90s after IV fluids.  WBC 14.  CT consistent with perforated appendicitis.     The patient's past surgical history is pertinent for no prior abdominal surgeries.     He is otherwise healthy with no medical problems.  He is from [place] and goes to college at [employer].     Post-Op Info:  Procedure(s) (LRB):  APPENDECTOMY-LAPAROSCOPIC (N/A)         Interval History:   No acute events overnight.  Low grade temp to 100.3. Continued mild nausea but without emesis.  One soft BM last night.  Reports "discomfort" on his R side of his abdomen.      Medications:  Continuous Infusions:   lactated ringers 125 mL/hr at 10/02/17 1321     Scheduled Meds:   ciprofloxacin  400 mg Intravenous Q12H    enoxaparin  40 mg Subcutaneous Q24H    famotidine (PF)  20 mg Intravenous BID    ketorolac  15 mg Intravenous Once    metronidazole  500 mg Intravenous Q8H    sodium chloride 0.9%  1,000 mL Intravenous Once    vancomycin (VANCOCIN) IVPB  1,000 mg Intravenous Q12H     PRN Meds:acetaminophen, albuterol-ipratropium 2.5mg-0.5mg/3mL, diphenhydrAMINE, HYDROmorphone, ondansetron, oxycodone-acetaminophen, oxycodone-acetaminophen, promethazine (PHENERGAN) IVPB, zolpidem     Review of patient's allergies indicates:  No " Known Allergies  Objective:     Vital Signs (Most Recent):  Temp: 100 °F (37.8 °C) (10/03/17 0415)  Pulse: 85 (10/03/17 0415)  Resp: 18 (10/03/17 0415)  BP: 116/62 (10/03/17 0415)  SpO2: 97 % (10/03/17 0415) Vital Signs (24h Range):  Temp:  [98.7 °F (37.1 °C)-100.3 °F (37.9 °C)] 100 °F (37.8 °C)  Pulse:  [85-88] 85  Resp:  [16-18] 18  SpO2:  [97 %-99 %] 97 %  BP: (116-129)/(62-73) 116/62     Weight: 77.1 kg (170 lb)  Body mass index is 19.65 kg/m².    Intake/Output - Last 3 Shifts       10/01 0700 - 10/02 0659 10/02 0700 - 10/03 0659    P.O. 600 240    I.V. (mL/kg) 2500 (32.4) 875 (11.3)    IV Piggyback 400     Total Intake(mL/kg) 3500 (45.4) 1115 (14.5)    Urine (mL/kg/hr)  0 (0)    Emesis/NG output  250 (0.1)    Stool  0 (0)    Total Output   250    Net +3500 +865          Urine Occurrence 4 x 2 x    Stool Occurrence 2 x 1 x    Emesis Occurrence 0 x 1 x          Physical Exam   Constitutional: He is oriented to person, place, and time. He appears well-developed and well-nourished. No distress.   HENT:   Head: Normocephalic and atraumatic.   Eyes: No scleral icterus.   Neck: No tracheal deviation present.   Cardiovascular: Normal rate.    Pulmonary/Chest: Effort normal. No respiratory distress.   Abdominal: Soft. He exhibits no distension. There is no guarding.   Mild diffuse tenderness   Neurological: He is alert and oriented to person, place, and time. No cranial nerve deficit.   Skin: Skin is warm. No erythema.   Psychiatric: He has a normal mood and affect. His behavior is normal.   Nursing note and vitals reviewed.      Significant Labs:  CBC:     Recent Labs  Lab 10/02/17  0548   WBC 9.34   RBC 4.28*   HGB 12.5*   HCT 35.7*   *   MCV 83   MCH 29.2   MCHC 35.0     CMP:     Recent Labs  Lab 10/02/17  0548   *   CALCIUM 9.1   ALBUMIN 2.7*   PROT 6.2   *   K 3.5   CO2 26   CL 98   BUN 12   CREATININE 0.8   ALKPHOS 54*   ALT 10   AST 8*   BILITOT 0.5       Significant Diagnostics:  CT: I have  reviewed all pertinent results/findings within the past 24 hours and my personal findings are:  perforated appendicitis    Assessment/Plan:     * Perforated appendicitis    Reza Meyer is a 21 y.o. male with perforated appendicitis, planned for OR today for diagnostic lap, possible drain placement, possible appendectomy    NPO   IVF  DVT and GI ppx  Prn pain meds  Ambulate  IV abx  Will obtain consent this AM -- already seen by anesthesia            Jonathan Schoen, MD  General Surgery  [facility]

## 2017-10-03 NOTE — HPI
Reza Meyer is a 21 y.o. male who presents to [facility] on 09/30/2017 with perforated appendicitis.     The patient reports lower abdominal pain since he woke up on Thursday morning.  He also describes what he thought was constipation from Wednesday until Friday then has been having diarrhea since then.  He has had nausea and vomiting and last vomited yesterday evening.  No prior episodes of similar symptoms.  Initially sought help at [facility] or something similar and was told he likely had constipation and gas.       Initially temperature of 101.1 in ED and tachycardic to 130, which has improved now in 90s after IV fluids.  WBC 14.  CT consistent with perforated appendicitis.     The patient's past surgical history is pertinent for no prior abdominal surgeries.     He is otherwise healthy with no medical problems.  He is from [place] and goes to college at [employer].

## 2017-10-03 NOTE — SUBJECTIVE & OBJECTIVE
"Interval History:   No acute events overnight.  Low grade temp to 100.3. Continued mild nausea but without emesis.  One soft BM last night.  Reports "discomfort" on his R side of his abdomen.      Medications:  Continuous Infusions:   lactated ringers 125 mL/hr at 10/02/17 1321     Scheduled Meds:   ciprofloxacin  400 mg Intravenous Q12H    enoxaparin  40 mg Subcutaneous Q24H    famotidine (PF)  20 mg Intravenous BID    ketorolac  15 mg Intravenous Once    metronidazole  500 mg Intravenous Q8H    sodium chloride 0.9%  1,000 mL Intravenous Once    vancomycin (VANCOCIN) IVPB  1,000 mg Intravenous Q12H     PRN Meds:acetaminophen, albuterol-ipratropium 2.5mg-0.5mg/3mL, diphenhydrAMINE, HYDROmorphone, ondansetron, oxycodone-acetaminophen, oxycodone-acetaminophen, promethazine (PHENERGAN) IVPB, zolpidem     Review of patient's allergies indicates:  No Known Allergies  Objective:     Vital Signs (Most Recent):  Temp: 100 °F (37.8 °C) (10/03/17 0415)  Pulse: 85 (10/03/17 0415)  Resp: 18 (10/03/17 0415)  BP: 116/62 (10/03/17 0415)  SpO2: 97 % (10/03/17 0415) Vital Signs (24h Range):  Temp:  [98.7 °F (37.1 °C)-100.3 °F (37.9 °C)] 100 °F (37.8 °C)  Pulse:  [85-88] 85  Resp:  [16-18] 18  SpO2:  [97 %-99 %] 97 %  BP: (116-129)/(62-73) 116/62     Weight: 77.1 kg (170 lb)  Body mass index is 19.65 kg/m².    Intake/Output - Last 3 Shifts       10/01 0700 - 10/02 0659 10/02 0700 - 10/03 0659    P.O. 600 240    I.V. (mL/kg) 2500 (32.4) 875 (11.3)    IV Piggyback 400     Total Intake(mL/kg) 3500 (45.4) 1115 (14.5)    Urine (mL/kg/hr)  0 (0)    Emesis/NG output  250 (0.1)    Stool  0 (0)    Total Output   250    Net +3500 +865          Urine Occurrence 4 x 2 x    Stool Occurrence 2 x 1 x    Emesis Occurrence 0 x 1 x          Physical Exam   Constitutional: He is oriented to person, place, and time. He appears well-developed and well-nourished. No distress.   HENT:   Head: Normocephalic and atraumatic.   Eyes: No scleral " icterus.   Neck: No tracheal deviation present.   Cardiovascular: Normal rate.    Pulmonary/Chest: Effort normal. No respiratory distress.   Abdominal: Soft. He exhibits no distension. There is no guarding.   Mild diffuse tenderness   Neurological: He is alert and oriented to person, place, and time. No cranial nerve deficit.   Skin: Skin is warm. No erythema.   Psychiatric: He has a normal mood and affect. His behavior is normal.   Nursing note and vitals reviewed.      Significant Labs:  CBC:     Recent Labs  Lab 10/02/17  0548   WBC 9.34   RBC 4.28*   HGB 12.5*   HCT 35.7*   *   MCV 83   MCH 29.2   MCHC 35.0     CMP:     Recent Labs  Lab 10/02/17  0548   *   CALCIUM 9.1   ALBUMIN 2.7*   PROT 6.2   *   K 3.5   CO2 26   CL 98   BUN 12   CREATININE 0.8   ALKPHOS 54*   ALT 10   AST 8*   BILITOT 0.5       Significant Diagnostics:  CT: I have reviewed all pertinent results/findings within the past 24 hours and my personal findings are:  perforated appendicitis

## 2017-10-03 NOTE — ANESTHESIA POSTPROCEDURE EVALUATION
"Anesthesia Post Evaluation    Patient: Reza Meyer    Procedure(s) Performed: Procedure(s) (LRB):  APPENDECTOMY-LAPAROSCOPIC (N/A)    Final Anesthesia Type: general  Patient location during evaluation: PACU  Patient participation: Yes- Able to Participate  Level of consciousness: awake and alert and oriented  Post-procedure vital signs: reviewed and stable  Pain management: adequate  Airway patency: patent  PONV status at discharge: No PONV  Anesthetic complications: no      Cardiovascular status: stable  Respiratory status: unassisted  Hydration status: euvolemic  Follow-up not needed.        Visit Vitals  /68   Pulse 77   Temp 36.2 °C (97.2 °F) (Oral)   Resp 18   Ht 6' 6" (1.981 m)   Wt 77.1 kg (170 lb)   SpO2 99%   BMI 19.65 kg/m²       Pain/Edgar Score: Pain Assessment Performed: Yes (10/3/2017  4:10 PM)  Presence of Pain: complains of pain/discomfort (10/3/2017  4:10 PM)  Pain Rating Prior to Med Admin: 4 (10/3/2017  4:53 PM)  Pain Rating Post Med Admin: 3 (10/3/2017  4:10 PM)  Edgar Score: 10 (10/3/2017  4:00 PM)      "

## 2017-10-04 LAB
ANION GAP SERPL CALC-SCNC: 10 MMOL/L
BASOPHILS # BLD AUTO: 0.01 K/UL
BASOPHILS NFR BLD: 0.1 %
BUN SERPL-MCNC: 8 MG/DL
CALCIUM SERPL-MCNC: 8 MG/DL
CHLORIDE SERPL-SCNC: 102 MMOL/L
CO2 SERPL-SCNC: 25 MMOL/L
CREAT SERPL-MCNC: 0.7 MG/DL
DIFFERENTIAL METHOD: ABNORMAL
EOSINOPHIL # BLD AUTO: 0.1 K/UL
EOSINOPHIL NFR BLD: 0.9 %
ERYTHROCYTE [DISTWIDTH] IN BLOOD BY AUTOMATED COUNT: 14.4 %
EST. GFR  (AFRICAN AMERICAN): >60 ML/MIN/1.73 M^2
EST. GFR  (NON AFRICAN AMERICAN): >60 ML/MIN/1.73 M^2
GLUCOSE SERPL-MCNC: 95 MG/DL
HCT VFR BLD AUTO: 33.9 %
HGB BLD-MCNC: 11.7 G/DL
LYMPHOCYTES # BLD AUTO: 0.7 K/UL
LYMPHOCYTES NFR BLD: 9 %
MAGNESIUM SERPL-MCNC: 1.7 MG/DL
MCH RBC QN AUTO: 28.9 PG
MCHC RBC AUTO-ENTMCNC: 34.5 G/DL
MCV RBC AUTO: 84 FL
MONOCYTES # BLD AUTO: 0.8 K/UL
MONOCYTES NFR BLD: 11 %
NEUTROPHILS # BLD AUTO: 6 K/UL
NEUTROPHILS NFR BLD: 78.6 %
PHOSPHATE SERPL-MCNC: 2.7 MG/DL
PLATELET # BLD AUTO: 170 K/UL
PMV BLD AUTO: 9.8 FL
POTASSIUM SERPL-SCNC: 3.4 MMOL/L
RBC # BLD AUTO: 4.05 M/UL
SODIUM SERPL-SCNC: 137 MMOL/L
WBC # BLD AUTO: 7.65 K/UL

## 2017-10-04 PROCEDURE — 25000003 PHARM REV CODE 250

## 2017-10-04 PROCEDURE — 63600175 PHARM REV CODE 636 W HCPCS: Performed by: SURGERY

## 2017-10-04 PROCEDURE — 11000001 HC ACUTE MED/SURG PRIVATE ROOM

## 2017-10-04 PROCEDURE — 85025 COMPLETE CBC W/AUTO DIFF WBC: CPT

## 2017-10-04 PROCEDURE — 84100 ASSAY OF PHOSPHORUS: CPT

## 2017-10-04 PROCEDURE — 83735 ASSAY OF MAGNESIUM: CPT

## 2017-10-04 PROCEDURE — 25000003 PHARM REV CODE 250: Performed by: SURGERY

## 2017-10-04 PROCEDURE — S0028 INJECTION, FAMOTIDINE, 20 MG: HCPCS | Performed by: SURGERY

## 2017-10-04 PROCEDURE — 36415 COLL VENOUS BLD VENIPUNCTURE: CPT

## 2017-10-04 PROCEDURE — 80048 BASIC METABOLIC PNL TOTAL CA: CPT

## 2017-10-04 PROCEDURE — S0030 INJECTION, METRONIDAZOLE: HCPCS | Performed by: SURGERY

## 2017-10-04 RX ORDER — AMOXICILLIN 250 MG
2 CAPSULE ORAL 2 TIMES DAILY
Status: DISCONTINUED | OUTPATIENT
Start: 2017-10-04 | End: 2017-10-06 | Stop reason: HOSPADM

## 2017-10-04 RX ORDER — POTASSIUM CHLORIDE 20 MEQ/15ML
60 SOLUTION ORAL ONCE
Status: COMPLETED | OUTPATIENT
Start: 2017-10-04 | End: 2017-10-04

## 2017-10-04 RX ORDER — OXYCODONE AND ACETAMINOPHEN 5; 325 MG/1; MG/1
1 TABLET ORAL EVERY 4 HOURS PRN
Qty: 21 TABLET | Refills: 0 | Status: SHIPPED | OUTPATIENT
Start: 2017-10-04 | End: 2017-10-06

## 2017-10-04 RX ORDER — LANOLIN ALCOHOL/MO/W.PET/CERES
400 CREAM (GRAM) TOPICAL ONCE
Status: COMPLETED | OUTPATIENT
Start: 2017-10-04 | End: 2017-10-04

## 2017-10-04 RX ADMIN — CIPROFLOXACIN 400 MG: 2 INJECTION, SOLUTION INTRAVENOUS at 10:10

## 2017-10-04 RX ADMIN — FAMOTIDINE 20 MG: 10 INJECTION, SOLUTION INTRAVENOUS at 08:10

## 2017-10-04 RX ADMIN — ENOXAPARIN SODIUM 40 MG: 100 INJECTION SUBCUTANEOUS at 04:10

## 2017-10-04 RX ADMIN — STANDARDIZED SENNA CONCENTRATE AND DOCUSATE SODIUM 2 TABLET: 8.6; 5 TABLET, FILM COATED ORAL at 09:10

## 2017-10-04 RX ADMIN — METRONIDAZOLE 500 MG: 500 INJECTION, SOLUTION INTRAVENOUS at 04:10

## 2017-10-04 RX ADMIN — CIPROFLOXACIN 400 MG: 2 INJECTION, SOLUTION INTRAVENOUS at 11:10

## 2017-10-04 RX ADMIN — CIPROFLOXACIN 400 MG: 2 INJECTION, SOLUTION INTRAVENOUS at 12:10

## 2017-10-04 RX ADMIN — ZOLPIDEM TARTRATE 5 MG: 5 TABLET, FILM COATED ORAL at 08:10

## 2017-10-04 RX ADMIN — METRONIDAZOLE 500 MG: 500 INJECTION, SOLUTION INTRAVENOUS at 06:10

## 2017-10-04 RX ADMIN — POTASSIUM CHLORIDE 60 MEQ: 20 SOLUTION ORAL at 09:10

## 2017-10-04 RX ADMIN — CELECOXIB 200 MG: 200 CAPSULE ORAL at 08:10

## 2017-10-04 RX ADMIN — METRONIDAZOLE 500 MG: 500 INJECTION, SOLUTION INTRAVENOUS at 10:10

## 2017-10-04 RX ADMIN — OXYCODONE HYDROCHLORIDE AND ACETAMINOPHEN 1 TABLET: 10; 325 TABLET ORAL at 08:10

## 2017-10-04 RX ADMIN — OXYCODONE HYDROCHLORIDE AND ACETAMINOPHEN 1 TABLET: 10; 325 TABLET ORAL at 01:10

## 2017-10-04 RX ADMIN — STANDARDIZED SENNA CONCENTRATE AND DOCUSATE SODIUM 2 TABLET: 8.6; 5 TABLET, FILM COATED ORAL at 08:10

## 2017-10-04 RX ADMIN — MAGNESIUM OXIDE TAB 400 MG (241.3 MG ELEMENTAL MG) 400 MG: 400 (241.3 MG) TAB at 09:10

## 2017-10-04 NOTE — PLAN OF CARE
Problem: Patient Care Overview  Goal: Plan of Care Review  Outcome: Ongoing (interventions implemented as appropriate)  Pt AA0x3 and VSS. Family at bedside. Two side rails up, bed locked, call light within reach. No falls noted as these precautions remain. Pt free of skin breakdown as the pt moves well independently. Surgical dressing clean, dry, and intact. IVF infusing to PIV. Pt has ambulated twice in the hallway so far. No complaints of nausea. Pain controlled well with PRN meds. Hourly rounds made and no complaints at this time noted. Will resume with plan of care.

## 2017-10-04 NOTE — OP NOTE
DATE OF PROCEDURE:  10/03/2017.    SERVICE:  General Surgery.    SURGEON:  Reza Keyes Jr., M.D.    ASSISTANT:  Arturo Wu M.D. (RES).    PREOPERATIVE DIAGNOSIS:  Perforated appendicitis failing conservative treatment.    POSTOPERATIVE DIAGNOSIS:  Perforated appendicitis failing conservative   treatment.    PROCEDURE:  Laparoscopic appendectomy with abdominal washout.    ANESTHESIA:  General endotracheal and local.    DESCRIPTION OF PROCEDURE:  The patient was taken to the Operating Room, placed   under general anesthesia.  He was taken after he presented on Saturday with   perforated appendicitis, but his pain was not relieved with conservative   measures of IV antibiotics.  His white count did respond, but again he was   continued to have abdominal pain.  Repeat scan continued to show slightly   increased fluid position.  For that reason and lack of progression, we elected   to proceed to the OR, prepped and draped in sterile fashion after placed under   general anesthesia.  At this time, an infraumbilical incision was made after   infiltrating with local anesthetic.  This was carried down with electrocautery   and blunt dissection to the fascia.  At each side of the fascia of the linea   alba was elevated with Kocher clamps.  It was opened sharply at this time, a 0   Vicryl suture was placed in figure-of-eight fashion in the fascia; however, was   not tied down at this time.  A 12 mm port was placed into the abdominal cavity   and pneumoperitoneum was placed.  At this time, a scope was placed in the   abdominal cavity.  We noticed there were significant adhesions present   throughout the pelvis and the right lower quadrant.  In addition, there was   fluid over the liver.  At this time, we proceeded with placement of further   ports.  There was a suprapubic port placed under direct visualization after   infiltrating with local anesthetic along with a left lower quadrant port.  Once   these were placed,  we began dissection.  The cecum was noted to be stuck to the   lateral abdominal wall.  We were able to dissect this down.  Similarly, the   midline omentum was stuck, we proceeded with dissection of this down as well.    We continued dissection of the colon off the lateral abdominal wall, freeing up   of the cecum.  There was some certainly significant rind present throughout the   pelvis.  This was stripped small bowel loops were broken up as well.  We   continued this dissection until we were able to get down to the cecum.  Upon   doing this, we did open up a pocket where pus was evacuated.  This was suctioned   quickly and there was not significant spillage.  We continued dissection again.    Another pocket of pus was identified and was suctioned.  At this time, we   continued this dissection carefully around the bowel until we were able to   identify the appendix.  We noted the perforation present.  We were able to   carefully dissect this away from the surrounding structures.  Of note, there was   significant inflammation surrounding all of these.  We were able to elevate   this and we were able to get to the base of the appendix.  We dissected the   mesoappendix at the base and we then transected this with a white load stapler.    At this time, we elevated the appendix and transected this, placed this in the   bag and passed if off the table.  We continued dissection looking for any other   pockets of pus.  Upon doing this, we noted that as we thought we were at the   base, there was still a small stump of the appendix left.  We dissected around   this until we can get to the very base and then removed this stump of the   appendix as well.  Again with a blue load stapler, placed this in a bag as well   and removed this.  We had certainly got into the base of the appendix at this   time, continued dissection, looking for again any interloop fluid collections.    We inspected over the liver suctioned this and  noted no other abnormalities   here.  We proceeded with the patient in Trendelenburg to evaluate the pelvis.    Any fluid was suctioned, we peeled some of the rind away and took down all the   adhesive omentum.  Once this was complete, we did not find any further   abnormalities in the right lower quadrant and there were no other pus   collections after we had broken up all of the small bowel and we irrigated   copiously with 2 liters of normal saline laparoscopically and suctioned this.    Once this was complete, we removed the scope, evacuated the air, removed the   ports.  The fascia of the infraumbilical incision was closed with the 0 Vicryl   suture, which had been placed at the beginning of the case and the skin of all 3   ports was closed with 4-0 Monocryl in subcuticular fashion.  Mastisol and   Steri-Strips were placed.  The patient was allowed to awake from general   anesthesia and transferred to his bed for transfer to Recovery.    COMPLICATIONS:  None.    SPONGE COUNT:  Correct.    BLOOD LOSS:  25 mL    FLUIDS:  Per Anesthesia.    BLOOD GIVEN:  None.    DRAINS:  None.    SPECIMENS:  Appendix.    CONDITION OF PATIENT:  Good.    I was present for the entire procedure.      ADDIE  dd: 10/03/2017 17:34:56 (CDT)  td: 10/03/2017 19:03:14 (CDT)  Doc ID   #[id number]  Job ID #[id number]    CC:

## 2017-10-04 NOTE — SUBJECTIVE & OBJECTIVE
Interval History:   No acute events overnight.    Nausea improved after surgery  No flatus or bowel movements  Still with some right sided discomfort    Medications:  Continuous Infusions:   lactated ringers 125 mL/hr at 10/03/17 2245     Scheduled Meds:   celecoxib  200 mg Oral BID    ciprofloxacin  400 mg Intravenous Q12H    enoxaparin  40 mg Subcutaneous Q24H    famotidine (PF)  20 mg Intravenous BID    magnesium oxide  400 mg Oral Once    metronidazole  500 mg Intravenous Q8H    potassium chloride 10%  60 mEq Oral Once    senna-docusate 8.6-50 mg  2 tablet Oral BID     PRN Meds:acetaminophen, albuterol-ipratropium 2.5mg-0.5mg/3mL, diphenhydrAMINE, ondansetron, oxycodone-acetaminophen, oxycodone-acetaminophen, promethazine (PHENERGAN) IVPB, sodium chloride 0.9%, zolpidem     Review of patient's allergies indicates:  No Known Allergies  Objective:     Vital Signs (Most Recent):  Temp: 98.9 °F (37.2 °C) (10/04/17 0802)  Pulse: 85 (10/04/17 0802)  Resp: 14 (10/04/17 0802)  BP: 117/62 (10/04/17 0802)  SpO2: 97 % (10/04/17 0802) Vital Signs (24h Range):  Temp:  [97.2 °F (36.2 °C)-99.2 °F (37.3 °C)] 98.9 °F (37.2 °C)  Pulse:  [68-85] 85  Resp:  [12-20] 14  SpO2:  [95 %-100 %] 97 %  BP: (116-127)/(58-68) 117/62     Weight: 77.1 kg (170 lb)  Body mass index is 19.65 kg/m².    Intake/Output - Last 3 Shifts       10/02 0700 - 10/03 0659 10/03 0700 - 10/04 0659 10/04 0700 - 10/05 0659    P.O. 240      I.V. (mL/kg) 875 (11.3) 3797 (49.2)     IV Piggyback       Total Intake(mL/kg) 1115 (14.5) 3797 (49.2)     Urine (mL/kg/hr) 0 (0) 350 (0.2)     Emesis/NG output 250 (0.1)      Stool 0 (0)      Total Output 250 350      Net +865 +3447             Urine Occurrence 2 x 2 x     Stool Occurrence 1 x      Emesis Occurrence 1 x            Physical Exam   Constitutional: He is oriented to person, place, and time. He appears well-developed and well-nourished. No distress.   HENT:   Head: Normocephalic and atraumatic.   Eyes:  No scleral icterus.   Neck: No tracheal deviation present.   Cardiovascular: Normal rate.    Pulmonary/Chest: Effort normal. No respiratory distress.   Abdominal: Soft. He exhibits no distension. There is no guarding.   Mild diffuse tenderness   Neurological: He is alert and oriented to person, place, and time. No cranial nerve deficit.   Skin: Skin is warm. No erythema.   Psychiatric: He has a normal mood and affect. His behavior is normal.   Nursing note and vitals reviewed.      Significant Labs:  CBC:     Recent Labs  Lab 10/04/17  0441   WBC 7.65   RBC 4.05*   HGB 11.7*   HCT 33.9*      MCV 84   MCH 28.9   MCHC 34.5     CMP:     Recent Labs  Lab 10/03/17  0621 10/04/17  0441   * 95   CALCIUM 8.7 8.0*   ALBUMIN 2.4*  --    PROT 5.7*  --    * 137   K 3.3* 3.4*   CO2 27 25   CL 98 102   BUN 11 8   CREATININE 0.7 0.7   ALKPHOS 50*  --    ALT 8*  --    AST 8*  --    BILITOT 0.3  --        Significant Diagnostics:  CT: I have reviewed all pertinent results/findings within the past 24 hours and my personal findings are:  perforated appendicitis

## 2017-10-04 NOTE — PROGRESS NOTES
[facility]  General Surgery  Progress Note    Subjective:     History of Present Illness:  Reza Meyer is a 21 y.o. male who presents to [facility] on 09/30/2017 with perforated appendicitis.     The patient reports lower abdominal pain since he woke up on Thursday morning.  He also describes what he thought was constipation from Wednesday until Friday then has been having diarrhea since then.  He has had nausea and vomiting and last vomited yesterday evening.  No prior episodes of similar symptoms.  Initially sought help at [facility] or something similar and was told he likely had constipation and gas.       Initially temperature of 101.1 in ED and tachycardic to 130, which has improved now in 90s after IV fluids.  WBC 14.  CT consistent with perforated appendicitis.     The patient's past surgical history is pertinent for no prior abdominal surgeries.     He is otherwise healthy with no medical problems.  He is from [place] and goes to [employer] at [facility].     Post-Op Info:  Procedure(s) (LRB):  APPENDECTOMY-LAPAROSCOPIC (N/A)   1 Day Post-Op     Interval History:   No acute events overnight.    Nausea improved after surgery  No flatus or bowel movements  Still with some right sided discomfort    Medications:  Continuous Infusions:   lactated ringers 125 mL/hr at 10/03/17 2245     Scheduled Meds:   celecoxib  200 mg Oral BID    ciprofloxacin  400 mg Intravenous Q12H    enoxaparin  40 mg Subcutaneous Q24H    famotidine (PF)  20 mg Intravenous BID    magnesium oxide  400 mg Oral Once    metronidazole  500 mg Intravenous Q8H    potassium chloride 10%  60 mEq Oral Once    senna-docusate 8.6-50 mg  2 tablet Oral BID     PRN Meds:acetaminophen, albuterol-ipratropium 2.5mg-0.5mg/3mL, diphenhydrAMINE, ondansetron, oxycodone-acetaminophen, oxycodone-acetaminophen, promethazine (PHENERGAN) IVPB, sodium chloride 0.9%, zolpidem     Review of patient's allergies indicates:  No Known  Allergies  Objective:     Vital Signs (Most Recent):  Temp: 98.9 °F (37.2 °C) (10/04/17 0802)  Pulse: 85 (10/04/17 0802)  Resp: 14 (10/04/17 0802)  BP: 117/62 (10/04/17 0802)  SpO2: 97 % (10/04/17 0802) Vital Signs (24h Range):  Temp:  [97.2 °F (36.2 °C)-99.2 °F (37.3 °C)] 98.9 °F (37.2 °C)  Pulse:  [68-85] 85  Resp:  [12-20] 14  SpO2:  [95 %-100 %] 97 %  BP: (116-127)/(58-68) 117/62     Weight: 77.1 kg (170 lb)  Body mass index is 19.65 kg/m².    Intake/Output - Last 3 Shifts       10/02 0700 - 10/03 0659 10/03 0700 - 10/04 0659 10/04 0700 - 10/05 0659    P.O. 240      I.V. (mL/kg) 875 (11.3) 3797 (49.2)     IV Piggyback       Total Intake(mL/kg) 1115 (14.5) 3797 (49.2)     Urine (mL/kg/hr) 0 (0) 350 (0.2)     Emesis/NG output 250 (0.1)      Stool 0 (0)      Total Output 250 350      Net +865 +3447             Urine Occurrence 2 x 2 x     Stool Occurrence 1 x      Emesis Occurrence 1 x            Physical Exam   Constitutional: He is oriented to person, place, and time. He appears well-developed and well-nourished. No distress.   HENT:   Head: Normocephalic and atraumatic.   Eyes: No scleral icterus.   Neck: No tracheal deviation present.   Cardiovascular: Normal rate.    Pulmonary/Chest: Effort normal. No respiratory distress.   Abdominal: Soft. He exhibits no distension. There is no guarding.   Mild diffuse tenderness   Neurological: He is alert and oriented to person, place, and time. No cranial nerve deficit.   Skin: Skin is warm. No erythema.   Psychiatric: He has a normal mood and affect. His behavior is normal.   Nursing note and vitals reviewed.      Significant Labs:  CBC:     Recent Labs  Lab 10/04/17  0441   WBC 7.65   RBC 4.05*   HGB 11.7*   HCT 33.9*      MCV 84   MCH 28.9   MCHC 34.5     CMP:     Recent Labs  Lab 10/03/17  0621 10/04/17  0441   * 95   CALCIUM 8.7 8.0*   ALBUMIN 2.4*  --    PROT 5.7*  --    * 137   K 3.3* 3.4*   CO2 27 25   CL 98 102   BUN 11 8   CREATININE 0.7 0.7    ALKPHOS 50*  --    ALT 8*  --    AST 8*  --    BILITOT 0.3  --        Significant Diagnostics:  CT: I have reviewed all pertinent results/findings within the past 24 hours and my personal findings are:  perforated appendicitis    Assessment/Plan:     * Perforated appendicitis    Reza Meyer is a 21 y.o. male with perforated appendicitis s/p APPENDECTOMY-LAPAROSCOPIC (N/A) 1 Day Post-Op    NPO   IVF  DVT and GI ppx  Prn pain meds  Ambulate  Continue IV abx  Replace electrolytes            Arturo Wu MD  General Surgery  [facility]

## 2017-10-04 NOTE — ASSESSMENT & PLAN NOTE
Reza Betsy is a 21 y.o. male with perforated appendicitis s/p APPENDECTOMY-LAPAROSCOPIC (N/A) 1 Day Post-Op    NPO   IVF  DVT and GI ppx  Prn pain meds  Ambulate  Continue IV abx  Replace electrolytes

## 2017-10-04 NOTE — PLAN OF CARE
Problem: Patient Care Overview  Goal: Plan of Care Review  Outcome: Ongoing (interventions implemented as appropriate)  Pt asleep but easily aroused, VSS, no complaints at this time, no s/s of skin breakdown noted, no falls noted as precautions remain. Will resume with plan of care.

## 2017-10-05 PROBLEM — E87.6 HYPOKALEMIA: Status: ACTIVE | Noted: 2017-10-05

## 2017-10-05 LAB
ANION GAP SERPL CALC-SCNC: 10 MMOL/L
BACTERIA BLD CULT: NORMAL
BASOPHILS # BLD AUTO: 0.02 K/UL
BASOPHILS NFR BLD: 0.3 %
BUN SERPL-MCNC: 7 MG/DL
CALCIUM SERPL-MCNC: 8.3 MG/DL
CHLORIDE SERPL-SCNC: 102 MMOL/L
CO2 SERPL-SCNC: 25 MMOL/L
CREAT SERPL-MCNC: 0.6 MG/DL
DIFFERENTIAL METHOD: ABNORMAL
EOSINOPHIL # BLD AUTO: 0.3 K/UL
EOSINOPHIL NFR BLD: 3.7 %
ERYTHROCYTE [DISTWIDTH] IN BLOOD BY AUTOMATED COUNT: 14.6 %
EST. GFR  (AFRICAN AMERICAN): >60 ML/MIN/1.73 M^2
EST. GFR  (NON AFRICAN AMERICAN): >60 ML/MIN/1.73 M^2
GLUCOSE SERPL-MCNC: 90 MG/DL
HCT VFR BLD AUTO: 34.4 %
HGB BLD-MCNC: 11.7 G/DL
LYMPHOCYTES # BLD AUTO: 1.1 K/UL
LYMPHOCYTES NFR BLD: 14.3 %
MCH RBC QN AUTO: 28.5 PG
MCHC RBC AUTO-ENTMCNC: 34 G/DL
MCV RBC AUTO: 84 FL
MONOCYTES # BLD AUTO: 0.8 K/UL
MONOCYTES NFR BLD: 10.5 %
NEUTROPHILS # BLD AUTO: 5.4 K/UL
NEUTROPHILS NFR BLD: 70.8 %
PLATELET # BLD AUTO: 193 K/UL
PMV BLD AUTO: 10.2 FL
POTASSIUM SERPL-SCNC: 3.3 MMOL/L
RBC # BLD AUTO: 4.1 M/UL
SODIUM SERPL-SCNC: 137 MMOL/L
WBC # BLD AUTO: 7.55 K/UL

## 2017-10-05 PROCEDURE — S0028 INJECTION, FAMOTIDINE, 20 MG: HCPCS | Performed by: SURGERY

## 2017-10-05 PROCEDURE — 25000003 PHARM REV CODE 250: Performed by: ANESTHESIOLOGY

## 2017-10-05 PROCEDURE — 11000001 HC ACUTE MED/SURG PRIVATE ROOM

## 2017-10-05 PROCEDURE — 85025 COMPLETE CBC W/AUTO DIFF WBC: CPT

## 2017-10-05 PROCEDURE — 25000003 PHARM REV CODE 250: Performed by: SURGERY

## 2017-10-05 PROCEDURE — 36415 COLL VENOUS BLD VENIPUNCTURE: CPT

## 2017-10-05 PROCEDURE — 63600175 PHARM REV CODE 636 W HCPCS: Performed by: SURGERY

## 2017-10-05 PROCEDURE — A4216 STERILE WATER/SALINE, 10 ML: HCPCS | Performed by: ANESTHESIOLOGY

## 2017-10-05 PROCEDURE — 25000003 PHARM REV CODE 250

## 2017-10-05 PROCEDURE — 80048 BASIC METABOLIC PNL TOTAL CA: CPT

## 2017-10-05 RX ORDER — AMOXICILLIN AND CLAVULANATE POTASSIUM 875; 125 MG/1; MG/1
1 TABLET, FILM COATED ORAL EVERY 12 HOURS
Status: DISCONTINUED | OUTPATIENT
Start: 2017-10-05 | End: 2017-10-06 | Stop reason: HOSPADM

## 2017-10-05 RX ORDER — POTASSIUM CHLORIDE 750 MG/1
60 CAPSULE, EXTENDED RELEASE ORAL ONCE
Status: COMPLETED | OUTPATIENT
Start: 2017-10-05 | End: 2017-10-05

## 2017-10-05 RX ADMIN — AMOXICILLIN AND CLAVULANATE POTASSIUM 1 TABLET: 875; 125 TABLET, FILM COATED ORAL at 09:10

## 2017-10-05 RX ADMIN — OXYCODONE HYDROCHLORIDE AND ACETAMINOPHEN 1 TABLET: 10; 325 TABLET ORAL at 07:10

## 2017-10-05 RX ADMIN — SODIUM CHLORIDE, PRESERVATIVE FREE 3 ML: 5 INJECTION INTRAVENOUS at 09:10

## 2017-10-05 RX ADMIN — STANDARDIZED SENNA CONCENTRATE AND DOCUSATE SODIUM 2 TABLET: 8.6; 5 TABLET, FILM COATED ORAL at 09:10

## 2017-10-05 RX ADMIN — OXYCODONE HYDROCHLORIDE AND ACETAMINOPHEN 1 TABLET: 10; 325 TABLET ORAL at 02:10

## 2017-10-05 RX ADMIN — OXYCODONE HYDROCHLORIDE AND ACETAMINOPHEN 1 TABLET: 10; 325 TABLET ORAL at 08:10

## 2017-10-05 RX ADMIN — ZOLPIDEM TARTRATE 5 MG: 5 TABLET, FILM COATED ORAL at 07:10

## 2017-10-05 RX ADMIN — CELECOXIB 200 MG: 200 CAPSULE ORAL at 09:10

## 2017-10-05 RX ADMIN — FAMOTIDINE 20 MG: 10 INJECTION, SOLUTION INTRAVENOUS at 09:10

## 2017-10-05 RX ADMIN — POTASSIUM CHLORIDE 60 MEQ: 750 CAPSULE, EXTENDED RELEASE ORAL at 09:10

## 2017-10-05 RX ADMIN — ENOXAPARIN SODIUM 40 MG: 100 INJECTION SUBCUTANEOUS at 05:10

## 2017-10-05 NOTE — PROGRESS NOTES
[facility]  General Surgery  Progress Note    Subjective:     History of Present Illness:  Reza Meyer is a 21 y.o. male who presents to [facility] on 09/30/2017 with perforated appendicitis.     The patient reports lower abdominal pain since he woke up on Thursday morning.  He also describes what he thought was constipation from Wednesday until Friday then has been having diarrhea since then.  He has had nausea and vomiting and last vomited yesterday evening.  No prior episodes of similar symptoms.  Initially sought help at [facility] or something similar and was told he likely had constipation and gas.       Initially temperature of 101.1 in ED and tachycardic to 130, which has improved now in 90s after IV fluids.  WBC 14.  CT consistent with perforated appendicitis.     The patient's past surgical history is pertinent for no prior abdominal surgeries.     He is otherwise healthy with no medical problems.  He is from [place] and goes to [employer] at [facility].     Post-Op Info:  Procedure(s) (LRB):  APPENDECTOMY-LAPAROSCOPIC (N/A)   2 Days Post-Op     Interval History:   Small amount of clear emesis early this morning with no associated or ongoing nausea.  AF and VSS.  Continues to pass flatus.    Medications:  Continuous Infusions:     Scheduled Meds:   celecoxib  200 mg Oral BID    ciprofloxacin  400 mg Intravenous Q12H    enoxaparin  40 mg Subcutaneous Q24H    famotidine (PF)  20 mg Intravenous BID    metronidazole  500 mg Intravenous Q8H    senna-docusate 8.6-50 mg  2 tablet Oral BID     PRN Meds:acetaminophen, albuterol-ipratropium 2.5mg-0.5mg/3mL, diphenhydrAMINE, ondansetron, oxycodone-acetaminophen, oxycodone-acetaminophen, promethazine (PHENERGAN) IVPB, sodium chloride 0.9%, zolpidem     Review of patient's allergies indicates:  No Known Allergies  Objective:     Vital Signs (Most Recent):  Temp: 99.4 °F (37.4 °C) (10/05/17 0411)  Pulse: 79 (10/05/17 0411)  Resp: 18  (10/05/17 0411)  BP: (!) 141/74 (10/05/17 0411)  SpO2: 97 % (10/05/17 0411) Vital Signs (24h Range):  Temp:  [98.6 °F (37 °C)-99.6 °F (37.6 °C)] 99.4 °F (37.4 °C)  Pulse:  [66-85] 79  Resp:  [14-18] 18  SpO2:  [95 %-98 %] 97 %  BP: (117-141)/(59-74) 141/74     Weight: 77.1 kg (170 lb)  Body mass index is 19.65 kg/m².    Intake/Output - Last 3 Shifts       10/03 0700 - 10/04 0659 10/04 0700 - 10/05 0659 10/05 0700 - 10/06 0659    P.O.  120     I.V. (mL/kg) 3797 (49.2) 1000 (13)     Total Intake(mL/kg) 3797 (49.2) 1120 (14.5)     Urine (mL/kg/hr) 350 (0.2)      Emesis/NG output       Stool       Total Output 350        Net +3447 +1120             Urine Occurrence 2 x 2 x     Stool Occurrence  0 x     Emesis Occurrence  0 x           Physical Exam   Constitutional: He is oriented to person, place, and time. He appears well-developed and well-nourished. No distress.   HENT:   Head: Normocephalic and atraumatic.   Eyes: No scleral icterus.   Neck: No tracheal deviation present.   Cardiovascular: Normal rate.    Pulmonary/Chest: Effort normal. No respiratory distress.   Abdominal: Soft. He exhibits no distension. There is no guarding.   Expected RLQ tenderness and kathie-incisional tenderness   Neurological: He is alert and oriented to person, place, and time. No cranial nerve deficit.   Skin: Skin is warm. No erythema.   Psychiatric: He has a normal mood and affect. His behavior is normal.   Nursing note and vitals reviewed.      Significant Labs:  CBC:     Recent Labs  Lab 10/05/17  0419   WBC 7.55   RBC 4.10*   HGB 11.7*   HCT 34.4*      MCV 84   MCH 28.5   MCHC 34.0     CMP:     Recent Labs  Lab 10/03/17  0621  10/05/17  0419   *  < > 90   CALCIUM 8.7  < > 8.3*   ALBUMIN 2.4*  --   --    PROT 5.7*  --   --    *  < > 137   K 3.3*  < > 3.3*   CO2 27  < > 25   CL 98  < > 102   BUN 11  < > 7   CREATININE 0.7  < > 0.6   ALKPHOS 50*  --   --    ALT 8*  --   --    AST 8*  --   --    BILITOT 0.3  --   --     < > = values in this interval not displayed.    Significant Diagnostics:  No new studies    Assessment/Plan:     * Perforated appendicitis    Reza Meyer is a 21 y.o. male with perforated appendicitis s/p APPENDECTOMY-LAPAROSCOPIC (N/A) 2 Days Post-Op    Regular diet  Continue PO pain meds  Will d/c IV abx and start PO augmentin  Ambulating ad rufina  DVT ppx  Replacing potassium orally for hypokalemia  Will await resumption of bowel function prior to discharge        Hypokalemia    See above            Jonathan Schoen, MD  General Surgery  [facility]

## 2017-10-05 NOTE — PLAN OF CARE
Problem: Patient Care Overview  Goal: Plan of Care Review  Outcome: Ongoing (interventions implemented as appropriate)  Pt AA0x3 and VSS. Family at bedside. Two side rails up, bed locked, call light within reach. No falls noted as these precautions remain. Pt free of skin breakdown as the pt moves well independently. Surgical dressing clean, dry, and intact. Pt ambulating in hallways with no problems. SCDs on and functioning. Pain controlled well with PRN meds. Hourly rounds made and no complaints at this time noted. Will resume with plan of care.

## 2017-10-05 NOTE — ASSESSMENT & PLAN NOTE
Reza Betsy is a 21 y.o. male with perforated appendicitis s/p APPENDECTOMY-LAPAROSCOPIC (N/A) 2 Days Post-Op    Regular diet  Continue PO pain meds  Will d/c IV abx and start PO augmentin  Ambulating ad rufina  DVT ppx  Replacing potassium orally for hypokalemia  Will await resumption of bowel function prior to discharge

## 2017-10-06 VITALS
HEIGHT: 78 IN | TEMPERATURE: 98 F | DIASTOLIC BLOOD PRESSURE: 72 MMHG | WEIGHT: 170 LBS | SYSTOLIC BLOOD PRESSURE: 126 MMHG | BODY MASS INDEX: 19.67 KG/M2 | HEART RATE: 60 BPM | OXYGEN SATURATION: 98 % | RESPIRATION RATE: 14 BRPM

## 2017-10-06 LAB
ANION GAP SERPL CALC-SCNC: 8 MMOL/L
BASOPHILS # BLD AUTO: 0.02 K/UL
BASOPHILS NFR BLD: 0.2 %
BUN SERPL-MCNC: 8 MG/DL
CALCIUM SERPL-MCNC: 8.8 MG/DL
CHLORIDE SERPL-SCNC: 103 MMOL/L
CO2 SERPL-SCNC: 27 MMOL/L
CREAT SERPL-MCNC: 0.7 MG/DL
DIFFERENTIAL METHOD: ABNORMAL
EOSINOPHIL # BLD AUTO: 0.5 K/UL
EOSINOPHIL NFR BLD: 6.2 %
ERYTHROCYTE [DISTWIDTH] IN BLOOD BY AUTOMATED COUNT: 14.5 %
EST. GFR  (AFRICAN AMERICAN): >60 ML/MIN/1.73 M^2
EST. GFR  (NON AFRICAN AMERICAN): >60 ML/MIN/1.73 M^2
GLUCOSE SERPL-MCNC: 96 MG/DL
HCT VFR BLD AUTO: 35.6 %
HGB BLD-MCNC: 12 G/DL
LYMPHOCYTES # BLD AUTO: 1.3 K/UL
LYMPHOCYTES NFR BLD: 16.1 %
MCH RBC QN AUTO: 29.1 PG
MCHC RBC AUTO-ENTMCNC: 33.7 G/DL
MCV RBC AUTO: 86 FL
MONOCYTES # BLD AUTO: 1 K/UL
MONOCYTES NFR BLD: 11.8 %
NEUTROPHILS # BLD AUTO: 5.2 K/UL
NEUTROPHILS NFR BLD: 64.7 %
PLATELET # BLD AUTO: 222 K/UL
PMV BLD AUTO: 9.5 FL
POTASSIUM SERPL-SCNC: 3.6 MMOL/L
RBC # BLD AUTO: 4.13 M/UL
SODIUM SERPL-SCNC: 138 MMOL/L
WBC # BLD AUTO: 8.02 K/UL

## 2017-10-06 PROCEDURE — 25000003 PHARM REV CODE 250: Performed by: SURGERY

## 2017-10-06 PROCEDURE — 36415 COLL VENOUS BLD VENIPUNCTURE: CPT

## 2017-10-06 PROCEDURE — 85025 COMPLETE CBC W/AUTO DIFF WBC: CPT

## 2017-10-06 PROCEDURE — 80048 BASIC METABOLIC PNL TOTAL CA: CPT

## 2017-10-06 PROCEDURE — S0028 INJECTION, FAMOTIDINE, 20 MG: HCPCS | Performed by: SURGERY

## 2017-10-06 PROCEDURE — 25000003 PHARM REV CODE 250

## 2017-10-06 PROCEDURE — A4216 STERILE WATER/SALINE, 10 ML: HCPCS

## 2017-10-06 RX ORDER — SIMETHICONE 80 MG
1 TABLET,CHEWABLE ORAL EVERY 8 HOURS PRN
Status: DISCONTINUED | OUTPATIENT
Start: 2017-10-06 | End: 2017-10-06 | Stop reason: HOSPADM

## 2017-10-06 RX ORDER — IBUPROFEN 200 MG
16 TABLET ORAL
Status: DISCONTINUED | OUTPATIENT
Start: 2017-10-06 | End: 2017-10-06 | Stop reason: HOSPADM

## 2017-10-06 RX ORDER — OXYCODONE AND ACETAMINOPHEN 5; 325 MG/1; MG/1
1 TABLET ORAL EVERY 4 HOURS PRN
Qty: 21 TABLET | Refills: 0 | Status: SHIPPED | OUTPATIENT
Start: 2017-10-06

## 2017-10-06 RX ORDER — ONDANSETRON 8 MG/1
8 TABLET, ORALLY DISINTEGRATING ORAL EVERY 8 HOURS PRN
Status: DISCONTINUED | OUTPATIENT
Start: 2017-10-06 | End: 2017-10-06 | Stop reason: HOSPADM

## 2017-10-06 RX ORDER — ENOXAPARIN SODIUM 100 MG/ML
40 INJECTION SUBCUTANEOUS
Status: DISCONTINUED | OUTPATIENT
Start: 2017-10-06 | End: 2017-10-06 | Stop reason: HOSPADM

## 2017-10-06 RX ORDER — OXYCODONE HYDROCHLORIDE 5 MG/1
5 TABLET ORAL EVERY 4 HOURS PRN
Status: DISCONTINUED | OUTPATIENT
Start: 2017-10-06 | End: 2017-10-06 | Stop reason: HOSPADM

## 2017-10-06 RX ORDER — GLUCAGON 1 MG
1 KIT INJECTION CONTINUOUS PRN
Status: DISCONTINUED | OUTPATIENT
Start: 2017-10-06 | End: 2017-10-06 | Stop reason: HOSPADM

## 2017-10-06 RX ORDER — NALOXONE HCL 0.4 MG/ML
0.02 VIAL (ML) INJECTION
Status: DISCONTINUED | OUTPATIENT
Start: 2017-10-06 | End: 2017-10-06 | Stop reason: HOSPADM

## 2017-10-06 RX ORDER — PROMETHAZINE HYDROCHLORIDE 12.5 MG/1
12.5 TABLET ORAL EVERY 6 HOURS PRN
Status: DISCONTINUED | OUTPATIENT
Start: 2017-10-06 | End: 2017-10-06 | Stop reason: HOSPADM

## 2017-10-06 RX ORDER — CALCIUM CARBONATE 200(500)MG
1000 TABLET,CHEWABLE ORAL EVERY 8 HOURS PRN
Status: DISCONTINUED | OUTPATIENT
Start: 2017-10-06 | End: 2017-10-06 | Stop reason: HOSPADM

## 2017-10-06 RX ORDER — RAMELTEON 8 MG/1
8 TABLET ORAL NIGHTLY PRN
Status: DISCONTINUED | OUTPATIENT
Start: 2017-10-06 | End: 2017-10-06

## 2017-10-06 RX ORDER — AMOXICILLIN AND CLAVULANATE POTASSIUM 875; 125 MG/1; MG/1
1 TABLET, FILM COATED ORAL EVERY 12 HOURS
Qty: 14 TABLET | Refills: 0 | Status: SHIPPED | OUTPATIENT
Start: 2017-10-06 | End: 2017-10-13

## 2017-10-06 RX ORDER — DIPHENHYDRAMINE HYDROCHLORIDE 50 MG/ML
12.5 INJECTION INTRAMUSCULAR; INTRAVENOUS EVERY 4 HOURS PRN
Status: DISCONTINUED | OUTPATIENT
Start: 2017-10-06 | End: 2017-10-06 | Stop reason: HOSPADM

## 2017-10-06 RX ORDER — INSULIN ASPART 100 [IU]/ML
1-10 INJECTION, SOLUTION INTRAVENOUS; SUBCUTANEOUS
Status: DISCONTINUED | OUTPATIENT
Start: 2017-10-06 | End: 2017-10-06 | Stop reason: HOSPADM

## 2017-10-06 RX ORDER — SODIUM CHLORIDE 0.9 % (FLUSH) 0.9 %
3 SYRINGE (ML) INJECTION EVERY 8 HOURS
Status: DISCONTINUED | OUTPATIENT
Start: 2017-10-06 | End: 2017-10-06 | Stop reason: HOSPADM

## 2017-10-06 RX ORDER — AMOXICILLIN 250 MG
2 CAPSULE ORAL 2 TIMES DAILY
COMMUNITY
Start: 2017-10-06

## 2017-10-06 RX ORDER — POTASSIUM CHLORIDE 750 MG/1
60 CAPSULE, EXTENDED RELEASE ORAL ONCE
Status: COMPLETED | OUTPATIENT
Start: 2017-10-06 | End: 2017-10-06

## 2017-10-06 RX ORDER — IBUPROFEN 200 MG
24 TABLET ORAL
Status: DISCONTINUED | OUTPATIENT
Start: 2017-10-06 | End: 2017-10-06 | Stop reason: HOSPADM

## 2017-10-06 RX ORDER — AMOXICILLIN AND CLAVULANATE POTASSIUM 875; 125 MG/1; MG/1
1 TABLET, FILM COATED ORAL EVERY 12 HOURS
Qty: 14 TABLET | Refills: 0 | Status: SHIPPED | OUTPATIENT
Start: 2017-10-06 | End: 2017-10-06

## 2017-10-06 RX ORDER — OXYCODONE HYDROCHLORIDE 5 MG/1
10 TABLET ORAL EVERY 4 HOURS PRN
Status: DISCONTINUED | OUTPATIENT
Start: 2017-10-06 | End: 2017-10-06 | Stop reason: HOSPADM

## 2017-10-06 RX ADMIN — FAMOTIDINE 20 MG: 10 INJECTION, SOLUTION INTRAVENOUS at 08:10

## 2017-10-06 RX ADMIN — OXYCODONE HYDROCHLORIDE 10 MG: 5 TABLET ORAL at 06:10

## 2017-10-06 RX ADMIN — POTASSIUM CHLORIDE 60 MEQ: 750 CAPSULE, EXTENDED RELEASE ORAL at 08:10

## 2017-10-06 RX ADMIN — STANDARDIZED SENNA CONCENTRATE AND DOCUSATE SODIUM 2 TABLET: 8.6; 5 TABLET, FILM COATED ORAL at 08:10

## 2017-10-06 RX ADMIN — AMOXICILLIN AND CLAVULANATE POTASSIUM 1 TABLET: 875; 125 TABLET, FILM COATED ORAL at 08:10

## 2017-10-06 RX ADMIN — SODIUM CHLORIDE, PRESERVATIVE FREE 3 ML: 5 INJECTION INTRAVENOUS at 06:10

## 2017-10-06 RX ADMIN — CELECOXIB 200 MG: 200 CAPSULE ORAL at 08:10

## 2017-10-06 NOTE — DISCHARGE SUMMARY
[facility]  General Surgery  Discharge Summary      Patient Name: Reza Meyer  MRN: [id number]  Admission Date: 9/30/2017  Hospital Length of Stay: 6 days  Discharge Date and Time:  10/06/2017   Attending Physician: Reza Keyes Jr.,*   Discharging Provider: Arturo Wu MD  Primary Care Provider: Primary Doctor No     HPI: Reza Meyer is a 21 y.o. male who presented with perforated appendicitis.       Procedure(s) (LRB):  APPENDECTOMY-LAPAROSCOPIC (N/A)     Hospital Course: He was initially managed conservatively with NPO / IVF / IV abx; however when advanced to clear liquids he had progressively worsening abdominal pain. The decision was made to take him to the OR. He underwent laparoscopic appendectomy without issue. He advanced along the expected post op course. His diet was eventually advanced and he was transitioned to PO abx. At the time of discharge he was tolerating a diet, his pain was controlled, and he had return of bowel function.  He will go home on 7 days of augmentin.    Consults: none    Significant Diagnostic Studies: Labs:   BMP:   Recent Labs  Lab 10/05/17  0419 10/06/17  0414   GLU 90 96    138   K 3.3* 3.6    103   CO2 25 27   BUN 7 8   CREATININE 0.6 0.7   CALCIUM 8.3* 8.8   , CMP   Recent Labs  Lab 10/05/17  0419 10/06/17  0414    138   K 3.3* 3.6    103   CO2 25 27   GLU 90 96   BUN 7 8   CREATININE 0.6 0.7   CALCIUM 8.3* 8.8   ANIONGAP 10 8   ESTGFRAFRICA >60.0 >60.0   EGFRNONAA >60.0 >60.0    and CBC   Recent Labs  Lab 10/05/17  0419 10/06/17  0414   WBC 7.55 8.02   HGB 11.7* 12.0*   HCT 34.4* 35.6*    222       Pending Diagnostic Studies:     None        Final Active Diagnoses:    Diagnosis Date Noted POA    PRINCIPAL PROBLEM:  Perforated appendicitis [K35.2] 09/30/2017 Yes    Hypokalemia [E87.6] 10/05/2017 No    Tachycardia with heart rate 100-120 beats per minute [R00.0] 10/03/2017 Yes      Problems Resolved During  this Admission:    Diagnosis Date Noted Date Resolved POA      Discharged Condition: stable    Disposition: Home or Self Care    Follow Up:  Follow-up Information     Reza Keyes Jr, MD In 2 weeks.    Specialties:  General Surgery, Bariatrics  Why:  Appt: 10/16/17 at 4:15 PM For wound re-check  Contact information:  [address]  [facility] [address]  [phone]                 Patient Instructions:     Diet general     Other restrictions (specify):   Scheduling Instructions: May resume diet as tolerated.   No heavy lifting or strenuous exercise until cleared by physician.  May shower; no baths or submerging in water (swimming,etc) for at least 2 weeks  Keep incision clean with soap and water.  Leave steri strips in place they will fall off on their own  Monitor for temp > 101.4, bleeding, redness, purulent drainage, or any extreme pain. If any occur, please call MD or go to ER.  Please resume all home meds and take newly prescribed meds.  Ok to fly, just make sure you ambulate on the plane  Follow up with Dr. Keyes in 2 weeks in clinic for post-op check. If no appointment made in 1 week, please call clinic.     Call MD for:  temperature >100.4     Call MD for:  persistent nausea and vomiting or diarrhea     Call MD for:  severe uncontrolled pain     Call MD for:  redness, tenderness, or signs of infection (pain, swelling, redness, odor or green/yellow discharge around incision site)     Call MD for:  difficulty breathing or increased cough     Call MD for:  severe persistent headache     No dressing needed       Medications:  Reconciled Home Medications:   Current Discharge Medication List      START taking these medications    Details   amoxicillin-clavulanate 875-125mg (AUGMENTIN) 875-125 mg per tablet Take 1 tablet by mouth every 12 (twelve) hours.  Qty: 14 tablet, Refills: 0      oxycodone-acetaminophen (PERCOCET) 5-325 mg per tablet Take 1 tablet by mouth every 4 (four) hours as  needed.  Qty: 21 tablet, Refills: 0      senna-docusate 8.6-50 mg (PERICOLACE) 8.6-50 mg per tablet Take 2 tablets by mouth 2 (two) times daily.             Arturo Wu MD  General Surgery  [facility]

## 2017-10-06 NOTE — PLAN OF CARE
Problem: Patient Care Overview  Goal: Plan of Care Review  Outcome: Outcome(s) achieved Date Met: 10/06/17  Patient is alert and oriented, vital signs are stable, with patient provided instruction in safety awareness and call light usage. No falls with precautions in effect. No s/s of pressure ulcers, with incisions intact. Patient independent with mobility. Pain managed with PRN medications. Fluids encouraged. Will continue to monitor patient.

## 2017-10-16 ENCOUNTER — OFFICE VISIT (OUTPATIENT)
Dept: SURGERY | Facility: CLINIC | Age: 22
End: 2017-10-16
Payer: COMMERCIAL

## 2017-10-16 VITALS
WEIGHT: 166.88 LBS | TEMPERATURE: 98 F | HEART RATE: 73 BPM | DIASTOLIC BLOOD PRESSURE: 59 MMHG | HEIGHT: 78 IN | BODY MASS INDEX: 19.31 KG/M2 | SYSTOLIC BLOOD PRESSURE: 124 MMHG

## 2017-10-16 DIAGNOSIS — Z09 POSTOP CHECK: Primary | ICD-10-CM

## 2017-10-16 PROBLEM — K35.32 PERFORATED APPENDICITIS: Status: RESOLVED | Noted: 2017-09-30 | Resolved: 2017-10-16

## 2017-10-16 PROBLEM — R00.0 TACHYCARDIA WITH HEART RATE 100-120 BEATS PER MINUTE: Status: RESOLVED | Noted: 2017-10-03 | Resolved: 2017-10-16

## 2017-10-16 PROCEDURE — 99999 PR PBB SHADOW E&M-EST. PATIENT-LVL III: CPT | Mod: PBBFAC,,, | Performed by: SURGERY

## 2017-10-16 PROCEDURE — 99024 POSTOP FOLLOW-UP VISIT: CPT | Mod: S$GLB,,, | Performed by: SURGERY

## 2017-10-16 NOTE — PROGRESS NOTES
HPI:  The patient is status post-lap appy.  Doing well.  Pain resolved.  No fevers.  Some sweating at nights but improving.  No abdominal pain.  Good appetite.   Strength returning.      PHYSICAL EXAM:  Physical Exam     In NAD  Incisions c/d/i  Abd Soft NT/ND    FINAL PATHOLOGIC DIAGNOSIS  APPENDIX:  INTENSE ACUTE NECROTIZING APPENDICITIS    ASSESSMENT:    The patient is doing well after surgery.     PLAN:    Follow up PRN.  Activity: light duty for 4 weeks        Reza Keyes MD

## 2019-10-08 NOTE — SUBJECTIVE & OBJECTIVE
Called patient to give him his arrival time and he said to cancel the procedure until further notice.    Interval History:   Small amount of clear emesis early this morning with no associated or ongoing nausea.  AF and VSS.  Continues to pass flatus.    Medications:  Continuous Infusions:     Scheduled Meds:   celecoxib  200 mg Oral BID    ciprofloxacin  400 mg Intravenous Q12H    enoxaparin  40 mg Subcutaneous Q24H    famotidine (PF)  20 mg Intravenous BID    metronidazole  500 mg Intravenous Q8H    senna-docusate 8.6-50 mg  2 tablet Oral BID     PRN Meds:acetaminophen, albuterol-ipratropium 2.5mg-0.5mg/3mL, diphenhydrAMINE, ondansetron, oxycodone-acetaminophen, oxycodone-acetaminophen, promethazine (PHENERGAN) IVPB, sodium chloride 0.9%, zolpidem     Review of patient's allergies indicates:  No Known Allergies  Objective:     Vital Signs (Most Recent):  Temp: 99.4 °F (37.4 °C) (10/05/17 0411)  Pulse: 79 (10/05/17 0411)  Resp: 18 (10/05/17 0411)  BP: (!) 141/74 (10/05/17 0411)  SpO2: 97 % (10/05/17 0411) Vital Signs (24h Range):  Temp:  [98.6 °F (37 °C)-99.6 °F (37.6 °C)] 99.4 °F (37.4 °C)  Pulse:  [66-85] 79  Resp:  [14-18] 18  SpO2:  [95 %-98 %] 97 %  BP: (117-141)/(59-74) 141/74     Weight: 77.1 kg (170 lb)  Body mass index is 19.65 kg/m².    Intake/Output - Last 3 Shifts       10/03 0700 - 10/04 0659 10/04 0700 - 10/05 0659 10/05 0700 - 10/06 0659    P.O.  120     I.V. (mL/kg) 3797 (49.2) 1000 (13)     Total Intake(mL/kg) 3797 (49.2) 1120 (14.5)     Urine (mL/kg/hr) 350 (0.2)      Emesis/NG output       Stool       Total Output 350        Net +3447 +1120             Urine Occurrence 2 x 2 x     Stool Occurrence  0 x     Emesis Occurrence  0 x           Physical Exam   Constitutional: He is oriented to person, place, and time. He appears well-developed and well-nourished. No distress.   HENT:   Head: Normocephalic and atraumatic.   Eyes: No scleral icterus.   Neck: No tracheal deviation present.   Cardiovascular: Normal rate.    Pulmonary/Chest: Effort normal. No respiratory distress.   Abdominal: Soft. He  exhibits no distension. There is no guarding.   Expected RLQ tenderness and kathie-incisional tenderness   Neurological: He is alert and oriented to person, place, and time. No cranial nerve deficit.   Skin: Skin is warm. No erythema.   Psychiatric: He has a normal mood and affect. His behavior is normal.   Nursing note and vitals reviewed.      Significant Labs:  CBC:     Recent Labs  Lab 10/05/17  0419   WBC 7.55   RBC 4.10*   HGB 11.7*   HCT 34.4*      MCV 84   MCH 28.5   MCHC 34.0     CMP:     Recent Labs  Lab 10/03/17  0621  10/05/17  0419   *  < > 90   CALCIUM 8.7  < > 8.3*   ALBUMIN 2.4*  --   --    PROT 5.7*  --   --    *  < > 137   K 3.3*  < > 3.3*   CO2 27  < > 25   CL 98  < > 102   BUN 11  < > 7   CREATININE 0.7  < > 0.6   ALKPHOS 50*  --   --    ALT 8*  --   --    AST 8*  --   --    BILITOT 0.3  --   --    < > = values in this interval not displayed.    Significant Diagnostics:  No new studies

## (undated) DEVICE — SUT MCRYL PLUS 4-0 PS2 27IN

## (undated) DEVICE — NDL HYPO REG 25G X 1 1/2

## (undated) DEVICE — STAPLER ECHELON FLEX 45MM 34CM

## (undated) DEVICE — CANNULA ENDOPATH XCEL 5X100MM

## (undated) DEVICE — ADHESIVE MASTISOL VIAL 48/BX

## (undated) DEVICE — SOL NS 1000CC

## (undated) DEVICE — WARMER DRAPE STERILE LF

## (undated) DEVICE — IRRIGATOR ENDOSCOPY DISP.

## (undated) DEVICE — STAPLER ECHELON 45 ENDOPATH

## (undated) DEVICE — SUT 0 VICRYL / UR6 (J603)

## (undated) DEVICE — DRAPE ABDOMINAL TIBURON 14X11

## (undated) DEVICE — TRAY MINOR GEN SURG

## (undated) DEVICE — BAG TISS RETRV MONARCH 10MM

## (undated) DEVICE — SOL IRR NACL .9% 3000ML

## (undated) DEVICE — CLOSURE SKIN STERI STRIP 1/8X3

## (undated) DEVICE — ELECTRODE REM PLYHSV RETURN 9

## (undated) DEVICE — RELOAD ECHELON ENDOPATH 45MM